# Patient Record
Sex: FEMALE | Race: WHITE | Employment: OTHER | URBAN - METROPOLITAN AREA
[De-identification: names, ages, dates, MRNs, and addresses within clinical notes are randomized per-mention and may not be internally consistent; named-entity substitution may affect disease eponyms.]

---

## 2017-03-02 ENCOUNTER — OP HISTORICAL/CONVERTED ENCOUNTER (OUTPATIENT)
Dept: OTHER | Age: 62
End: 2017-03-02

## 2017-03-06 ENCOUNTER — OP HISTORICAL/CONVERTED ENCOUNTER (OUTPATIENT)
Dept: OTHER | Age: 62
End: 2017-03-06

## 2017-03-13 ENCOUNTER — OP HISTORICAL/CONVERTED ENCOUNTER (OUTPATIENT)
Dept: OTHER | Age: 62
End: 2017-03-13

## 2020-03-05 ENCOUNTER — ED HISTORICAL/CONVERTED ENCOUNTER (OUTPATIENT)
Dept: OTHER | Age: 65
End: 2020-03-05

## 2020-05-19 ENCOUNTER — ED HISTORICAL/CONVERTED ENCOUNTER (OUTPATIENT)
Dept: OTHER | Age: 65
End: 2020-05-19

## 2021-05-11 ENCOUNTER — HOSPITAL ENCOUNTER (INPATIENT)
Age: 66
LOS: 3 days | Discharge: HOME | DRG: 871 | End: 2021-05-14
Attending: HOSPITALIST | Admitting: HOSPITALIST
Payer: MEDICARE

## 2021-05-11 ENCOUNTER — APPOINTMENT (OUTPATIENT)
Dept: GENERAL RADIOLOGY | Age: 66
DRG: 871 | End: 2021-05-11
Attending: PHYSICIAN ASSISTANT
Payer: MEDICARE

## 2021-05-11 ENCOUNTER — APPOINTMENT (OUTPATIENT)
Dept: CT IMAGING | Age: 66
DRG: 871 | End: 2021-05-11
Attending: PHYSICIAN ASSISTANT
Payer: MEDICARE

## 2021-05-11 ENCOUNTER — APPOINTMENT (OUTPATIENT)
Dept: NON INVASIVE DIAGNOSTICS | Age: 66
DRG: 871 | End: 2021-05-11
Attending: PHYSICIAN ASSISTANT
Payer: MEDICARE

## 2021-05-11 DIAGNOSIS — J44.1 COPD EXACERBATION (HCC): ICD-10-CM

## 2021-05-11 DIAGNOSIS — L03.119 CELLULITIS OF LOWER EXTREMITY, UNSPECIFIED LATERALITY: ICD-10-CM

## 2021-05-11 DIAGNOSIS — T40.2X1A OPIOID OVERDOSE, ACCIDENTAL OR UNINTENTIONAL, INITIAL ENCOUNTER (HCC): ICD-10-CM

## 2021-05-11 DIAGNOSIS — R60.9 PERIPHERAL EDEMA: ICD-10-CM

## 2021-05-11 DIAGNOSIS — D69.6 THROMBOCYTOPENIA (HCC): ICD-10-CM

## 2021-05-11 DIAGNOSIS — C50.919 METASTATIC BREAST CANCER (HCC): ICD-10-CM

## 2021-05-11 DIAGNOSIS — R41.82 ALTERED MENTAL STATUS, UNSPECIFIED ALTERED MENTAL STATUS TYPE: ICD-10-CM

## 2021-05-11 DIAGNOSIS — R50.9 FEVER OF UNKNOWN ORIGIN: Primary | ICD-10-CM

## 2021-05-11 PROBLEM — I10 HTN (HYPERTENSION): Status: ACTIVE | Noted: 2021-05-11

## 2021-05-11 PROBLEM — A41.9 SEPSIS (HCC): Status: ACTIVE | Noted: 2021-05-11

## 2021-05-11 PROBLEM — E78.5 HLD (HYPERLIPIDEMIA): Status: ACTIVE | Noted: 2021-05-11

## 2021-05-11 PROBLEM — J45.901 REACTIVE AIRWAY DISEASE WITH ACUTE EXACERBATION: Status: ACTIVE | Noted: 2021-05-11

## 2021-05-11 PROBLEM — F32.A DEPRESSION: Status: ACTIVE | Noted: 2021-05-11

## 2021-05-11 LAB
ALBUMIN SERPL-MCNC: 3 G/DL (ref 3.5–5)
ALBUMIN/GLOB SERPL: 0.7 {RATIO} (ref 1.1–2.2)
ALP SERPL-CCNC: 193 U/L (ref 45–117)
ALT SERPL-CCNC: 22 U/L (ref 12–78)
AMPHET UR QL SCN: NEGATIVE
ANION GAP SERPL CALC-SCNC: 6 MMOL/L (ref 5–15)
APPEARANCE UR: ABNORMAL
AST SERPL W P-5'-P-CCNC: 24 U/L (ref 15–37)
ATRIAL RATE: 85 BPM
BACTERIA URNS QL MICRO: NEGATIVE /HPF
BARBITURATES UR QL SCN: NEGATIVE
BASOPHILS # BLD: 0.1 K/UL (ref 0–0.1)
BASOPHILS NFR BLD: 2 % (ref 0–1)
BENZODIAZ UR QL: NEGATIVE
BILIRUB SERPL-MCNC: 0.6 MG/DL (ref 0.2–1)
BILIRUB UR QL: NEGATIVE
BUN SERPL-MCNC: 19 MG/DL (ref 6–20)
BUN/CREAT SERPL: 13 (ref 12–20)
CA-I BLD-MCNC: 8.9 MG/DL (ref 8.5–10.1)
CALCULATED P AXIS, ECG09: 65 DEGREES
CALCULATED R AXIS, ECG10: 67 DEGREES
CALCULATED T AXIS, ECG11: 45 DEGREES
CANNABINOIDS UR QL SCN: NEGATIVE
CHLORIDE SERPL-SCNC: 104 MMOL/L (ref 97–108)
CO2 SERPL-SCNC: 23 MMOL/L (ref 21–32)
COCAINE UR QL SCN: NEGATIVE
COLOR UR: ABNORMAL
COVID-19 RAPID TEST, COVR: DETECTED
CREAT SERPL-MCNC: 1.45 MG/DL (ref 0.55–1.02)
DIAGNOSIS, 93000: NORMAL
DIFFERENTIAL METHOD BLD: ABNORMAL
DRUG SCRN COMMENT,DRGCM: ABNORMAL
EOSINOPHIL # BLD: 0 K/UL (ref 0–0.4)
EOSINOPHIL NFR BLD: 0 % (ref 0–7)
ERYTHROCYTE [DISTWIDTH] IN BLOOD BY AUTOMATED COUNT: 14.7 % (ref 11.5–14.5)
ETHANOL SERPL-MCNC: <4 MG/DL
GLOBULIN SER CALC-MCNC: 4.2 G/DL (ref 2–4)
GLUCOSE BLD STRIP.AUTO-MCNC: 209 MG/DL (ref 65–117)
GLUCOSE SERPL-MCNC: 140 MG/DL (ref 65–100)
GLUCOSE UR STRIP.AUTO-MCNC: NEGATIVE MG/DL
HCT VFR BLD AUTO: 27.3 % (ref 35–47)
HGB BLD-MCNC: 8.8 G/DL (ref 11.5–16)
HGB UR QL STRIP: NEGATIVE
IMM GRANULOCYTES # BLD AUTO: 0 K/UL (ref 0–0.04)
IMM GRANULOCYTES NFR BLD AUTO: 0 % (ref 0–0.5)
KETONES UR QL STRIP.AUTO: NEGATIVE MG/DL
LACTATE SERPL-SCNC: 1.3 MMOL/L (ref 0.4–2)
LEUKOCYTE ESTERASE UR QL STRIP.AUTO: NEGATIVE
LYMPHOCYTES # BLD: 0.4 K/UL (ref 0.8–3.5)
LYMPHOCYTES NFR BLD: 16 % (ref 12–49)
MAGNESIUM SERPL-MCNC: 1.9 MG/DL (ref 1.6–2.4)
MCH RBC QN AUTO: 33.6 PG (ref 26–34)
MCHC RBC AUTO-ENTMCNC: 32.2 G/DL (ref 30–36.5)
MCV RBC AUTO: 104.2 FL (ref 80–99)
METHADONE UR QL: NEGATIVE
MONOCYTES # BLD: 0.4 K/UL (ref 0–1)
MONOCYTES NFR BLD: 10 % (ref 5–13)
MUCOUS THREADS URNS QL MICRO: ABNORMAL /LPF
NEUTS BAND NFR BLD MANUAL: 2 %
NEUTS SEG # BLD: 2.2 K/UL (ref 1.8–8)
NEUTS SEG NFR BLD: 72 % (ref 32–75)
NITRITE UR QL STRIP.AUTO: NEGATIVE
NRBC # BLD: 0 K/UL (ref 0–0.01)
NRBC BLD-RTO: 0 PER 100 WBC
OPIATES UR QL: POSITIVE
P-R INTERVAL, ECG05: 138 MS
PCP UR QL: NEGATIVE
PERFORMED BY, TECHID: ABNORMAL
PH UR STRIP: 7 [PH] (ref 5–8)
PLATELET # BLD AUTO: 138 K/UL (ref 150–400)
PMV BLD AUTO: 10.6 FL (ref 8.9–12.9)
POTASSIUM SERPL-SCNC: 3.8 MMOL/L (ref 3.5–5.1)
PROCALCITONIN SERPL-MCNC: 0.13 NG/ML
PROT SERPL-MCNC: 7.2 G/DL (ref 6.4–8.2)
PROT UR STRIP-MCNC: 100 MG/DL
Q-T INTERVAL, ECG07: 392 MS
QRS DURATION, ECG06: 90 MS
QTC CALCULATION (BEZET), ECG08: 466 MS
RBC # BLD AUTO: 2.62 M/UL (ref 3.8–5.2)
RBC #/AREA URNS HPF: ABNORMAL /HPF (ref 0–5)
RBC MORPH BLD: ABNORMAL
RBC MORPH BLD: ABNORMAL
SARS-COV-2, COV2: NORMAL
SODIUM SERPL-SCNC: 133 MMOL/L (ref 136–145)
SP GR UR REFRACTOMETRY: 1.01 (ref 1–1.03)
SPECIMEN SOURCE: ABNORMAL
TROPONIN I SERPL-MCNC: <0.05 NG/ML
TSH SERPL DL<=0.05 MIU/L-ACNC: 2.86 UIU/ML (ref 0.36–3.74)
UA: UC IF INDICATED,UAUC: ABNORMAL
UROBILINOGEN UR QL STRIP.AUTO: 4 EU/DL (ref 0.1–1)
VENTRICULAR RATE, ECG03: 85 BPM
WBC # BLD AUTO: 3 K/UL (ref 3.6–11)
WBC URNS QL MICRO: ABNORMAL /HPF (ref 0–4)

## 2021-05-11 PROCEDURE — 82962 GLUCOSE BLOOD TEST: CPT

## 2021-05-11 PROCEDURE — 96361 HYDRATE IV INFUSION ADD-ON: CPT

## 2021-05-11 PROCEDURE — 87040 BLOOD CULTURE FOR BACTERIA: CPT

## 2021-05-11 PROCEDURE — 74011250636 HC RX REV CODE- 250/636: Performed by: PHYSICIAN ASSISTANT

## 2021-05-11 PROCEDURE — 87186 SC STD MICRODIL/AGAR DIL: CPT

## 2021-05-11 PROCEDURE — 84484 ASSAY OF TROPONIN QUANT: CPT

## 2021-05-11 PROCEDURE — 74011250637 HC RX REV CODE- 250/637: Performed by: HOSPITALIST

## 2021-05-11 PROCEDURE — 84443 ASSAY THYROID STIM HORMONE: CPT

## 2021-05-11 PROCEDURE — 99218 HC RM OBSERVATION: CPT

## 2021-05-11 PROCEDURE — 83605 ASSAY OF LACTIC ACID: CPT

## 2021-05-11 PROCEDURE — 85025 COMPLETE CBC W/AUTO DIFF WBC: CPT

## 2021-05-11 PROCEDURE — 65270000029 HC RM PRIVATE

## 2021-05-11 PROCEDURE — 74011000258 HC RX REV CODE- 258: Performed by: PHYSICIAN ASSISTANT

## 2021-05-11 PROCEDURE — 93005 ELECTROCARDIOGRAM TRACING: CPT

## 2021-05-11 PROCEDURE — 71045 X-RAY EXAM CHEST 1 VIEW: CPT

## 2021-05-11 PROCEDURE — 99285 EMERGENCY DEPT VISIT HI MDM: CPT

## 2021-05-11 PROCEDURE — 87077 CULTURE AEROBIC IDENTIFY: CPT

## 2021-05-11 PROCEDURE — 81001 URINALYSIS AUTO W/SCOPE: CPT

## 2021-05-11 PROCEDURE — 80307 DRUG TEST PRSMV CHEM ANLYZR: CPT

## 2021-05-11 PROCEDURE — 82077 ASSAY SPEC XCP UR&BREATH IA: CPT

## 2021-05-11 PROCEDURE — 87086 URINE CULTURE/COLONY COUNT: CPT

## 2021-05-11 PROCEDURE — 96374 THER/PROPH/DIAG INJ IV PUSH: CPT

## 2021-05-11 PROCEDURE — 93970 EXTREMITY STUDY: CPT

## 2021-05-11 PROCEDURE — 74011250637 HC RX REV CODE- 250/637: Performed by: PHYSICIAN ASSISTANT

## 2021-05-11 PROCEDURE — 96375 TX/PRO/DX INJ NEW DRUG ADDON: CPT

## 2021-05-11 PROCEDURE — 74011250636 HC RX REV CODE- 250/636: Performed by: HOSPITALIST

## 2021-05-11 PROCEDURE — 80053 COMPREHEN METABOLIC PANEL: CPT

## 2021-05-11 PROCEDURE — 83735 ASSAY OF MAGNESIUM: CPT

## 2021-05-11 PROCEDURE — 84145 PROCALCITONIN (PCT): CPT

## 2021-05-11 PROCEDURE — 36415 COLL VENOUS BLD VENIPUNCTURE: CPT

## 2021-05-11 PROCEDURE — 87635 SARS-COV-2 COVID-19 AMP PRB: CPT

## 2021-05-11 PROCEDURE — 70450 CT HEAD/BRAIN W/O DYE: CPT

## 2021-05-11 RX ORDER — SODIUM CHLORIDE 0.9 % (FLUSH) 0.9 %
5-40 SYRINGE (ML) INJECTION EVERY 8 HOURS
Status: DISCONTINUED | OUTPATIENT
Start: 2021-05-11 | End: 2021-05-14 | Stop reason: HOSPADM

## 2021-05-11 RX ORDER — MONTELUKAST SODIUM 10 MG/1
10 TABLET ORAL DAILY
COMMUNITY

## 2021-05-11 RX ORDER — GABAPENTIN 300 MG/1
300 CAPSULE ORAL 3 TIMES DAILY
COMMUNITY

## 2021-05-11 RX ORDER — NALOXONE HYDROCHLORIDE 1 MG/ML
0.4 INJECTION INTRAMUSCULAR; INTRAVENOUS; SUBCUTANEOUS
Status: COMPLETED | OUTPATIENT
Start: 2021-05-11 | End: 2021-05-11

## 2021-05-11 RX ORDER — SODIUM CHLORIDE 0.9 % (FLUSH) 0.9 %
5-40 SYRINGE (ML) INJECTION AS NEEDED
Status: DISCONTINUED | OUTPATIENT
Start: 2021-05-11 | End: 2021-05-14 | Stop reason: HOSPADM

## 2021-05-11 RX ORDER — WARFARIN 1 MG/1
5 TABLET ORAL DAILY
COMMUNITY

## 2021-05-11 RX ORDER — TRAZODONE HYDROCHLORIDE 100 MG/1
100 TABLET ORAL
COMMUNITY

## 2021-05-11 RX ORDER — DILTIAZEM HYDROCHLORIDE 240 MG/1
240 CAPSULE, EXTENDED RELEASE ORAL DAILY
COMMUNITY

## 2021-05-11 RX ORDER — ALLOPURINOL 300 MG/1
300 TABLET ORAL DAILY
COMMUNITY

## 2021-05-11 RX ORDER — ACETAMINOPHEN 325 MG/1
650 TABLET ORAL
Status: COMPLETED | OUTPATIENT
Start: 2021-05-11 | End: 2021-05-11

## 2021-05-11 RX ORDER — POLYETHYLENE GLYCOL 3350 17 G/17G
17 POWDER, FOR SOLUTION ORAL DAILY PRN
Status: DISCONTINUED | OUTPATIENT
Start: 2021-05-11 | End: 2021-05-14 | Stop reason: HOSPADM

## 2021-05-11 RX ORDER — ACETAMINOPHEN 325 MG/1
650 TABLET ORAL
Status: DISCONTINUED | OUTPATIENT
Start: 2021-05-11 | End: 2021-05-14 | Stop reason: HOSPADM

## 2021-05-11 RX ORDER — PRAVASTATIN SODIUM 40 MG/1
40 TABLET ORAL
COMMUNITY

## 2021-05-11 RX ORDER — ABEMACICLIB 100 MG/1
100 TABLET ORAL
COMMUNITY

## 2021-05-11 RX ORDER — SERTRALINE HYDROCHLORIDE 50 MG/1
50 TABLET, FILM COATED ORAL DAILY
COMMUNITY

## 2021-05-11 RX ORDER — OXYCODONE HYDROCHLORIDE 10 MG/1
10 TABLET ORAL
COMMUNITY

## 2021-05-11 RX ORDER — ACETAMINOPHEN 650 MG/1
650 SUPPOSITORY RECTAL
Status: DISCONTINUED | OUTPATIENT
Start: 2021-05-11 | End: 2021-05-14 | Stop reason: HOSPADM

## 2021-05-11 RX ORDER — PROMETHAZINE HYDROCHLORIDE 25 MG/1
12.5 TABLET ORAL
Status: DISCONTINUED | OUTPATIENT
Start: 2021-05-11 | End: 2021-05-14 | Stop reason: HOSPADM

## 2021-05-11 RX ORDER — ONDANSETRON 2 MG/ML
4 INJECTION INTRAMUSCULAR; INTRAVENOUS
Status: DISCONTINUED | OUTPATIENT
Start: 2021-05-11 | End: 2021-05-14 | Stop reason: HOSPADM

## 2021-05-11 RX ORDER — THEOPHYLLINE 300 MG/1
300 TABLET, EXTENDED RELEASE ORAL 2 TIMES DAILY
COMMUNITY

## 2021-05-11 RX ORDER — IPRATROPIUM BROMIDE AND ALBUTEROL SULFATE 2.5; .5 MG/3ML; MG/3ML
3 SOLUTION RESPIRATORY (INHALATION)
Status: ACTIVE | OUTPATIENT
Start: 2021-05-11 | End: 2021-05-12

## 2021-05-11 RX ORDER — IPRATROPIUM BROMIDE AND ALBUTEROL SULFATE 2.5; .5 MG/3ML; MG/3ML
3 SOLUTION RESPIRATORY (INHALATION)
Status: DISCONTINUED | OUTPATIENT
Start: 2021-05-11 | End: 2021-05-14 | Stop reason: HOSPADM

## 2021-05-11 RX ORDER — CLONIDINE HYDROCHLORIDE 0.1 MG/1
0.1 TABLET ORAL 2 TIMES DAILY
COMMUNITY

## 2021-05-11 RX ADMIN — METHYLPREDNISOLONE SODIUM SUCCINATE 40 MG: 40 INJECTION, POWDER, FOR SOLUTION INTRAMUSCULAR; INTRAVENOUS at 20:55

## 2021-05-11 RX ADMIN — VANCOMYCIN HYDROCHLORIDE 1000 MG: 1 INJECTION, POWDER, LYOPHILIZED, FOR SOLUTION INTRAVENOUS at 20:20

## 2021-05-11 RX ADMIN — METHYLPREDNISOLONE SODIUM SUCCINATE 125 MG: 125 INJECTION, POWDER, FOR SOLUTION INTRAMUSCULAR; INTRAVENOUS at 14:05

## 2021-05-11 RX ADMIN — SODIUM CHLORIDE 1000 ML: 9 INJECTION, SOLUTION INTRAVENOUS at 11:32

## 2021-05-11 RX ADMIN — ACETAMINOPHEN 650 MG: 325 TABLET, FILM COATED ORAL at 11:55

## 2021-05-11 RX ADMIN — PIPERACILLIN AND TAZOBACTAM 3.38 G: 3; .375 INJECTION, POWDER, LYOPHILIZED, FOR SOLUTION INTRAVENOUS at 19:24

## 2021-05-11 RX ADMIN — ACETAMINOPHEN 650 MG: 325 TABLET, FILM COATED ORAL at 20:56

## 2021-05-11 RX ADMIN — NALOXONE HYDROCHLORIDE 0.4 MG: 1 INJECTION PARENTERAL at 14:37

## 2021-05-11 RX ADMIN — Medication 10 ML: at 17:11

## 2021-05-11 NOTE — ACP (ADVANCE CARE PLANNING)
Advance Care Planning   Healthcare Decision Maker:       Primary Decision Maker: Janessa Scales child - [phone]

## 2021-05-11 NOTE — Clinical Note
Patient Class[de-identified] OBSERVATION [104]  Type of Bed: Medical [8]  Cardiac Monitoring Required?: No  Reason for Observation: fever  Admitting Diagnosis: Fever [[id number]]  Admitting Physician: Isael Hector [[id number]]  Attending Physician: Isael Hector [[id number]]

## 2021-05-11 NOTE — ED TRIAGE NOTES
EMS brought to pt home by  who stated pt wasn't acting herself. Pt extremely lethargic, warm to touch.  en route. In triage, pt noted to have wet cough. A&Ox3.

## 2021-05-11 NOTE — H&P
History and Physical    Patient: Benito Snellen MRN: [id number]  SSN: [id number]    YOB: 1955  Age: 77 y.o. Sex: female      Subjective:      Chief Complaint: Lethargy and shortness of breath    HPI: Benito Snellen is a 77 y.o. female who history of breast cancer with metastasis, hyperlipidemia, hypertension, reactive airway disease came to ER with a complaint of worsening shortness of breath for about a week now. Patient was also very lethargic at home. Upon arrival to the ER patient was found to have feverTemperature 101.6 °F.  Patient does not remember any fevers at home. Patient has a watery bowel movement in ER which was quite foul-smelling. She denies any abdominal pain nausea or vomiting. Hospitalist service has been requested to admit the patient for further work-up and management. Past Medical History:   Diagnosis Date    Breast cancer ([facility] [address]      No past surgical history on file. No family history on file. Social History     Tobacco Use    Smoking status: Not on file   Substance Use Topics    Alcohol use: Not on file      Home medications were confirmed from patient's boyfriend Mr. Dori Shah phone #[phone]. Prior to Admission medications    Medication Sig Start Date End Date Taking? Authorizing Provider   cloNIDine HCL (CATAPRES) 0.1 mg tablet Take 0.1 mg by mouth two (2) times a day. Yes Provider, Historical   dilTIAZem ER (TIAZAC) 240 mg capsule Take 240 mg by mouth daily. Yes Provider, Historical   gabapentin (NEURONTIN) 300 mg capsule Take 300 mg by mouth three (3) times daily. Yes Provider, Historical   allopurinoL (ZYLOPRIM) 300 mg tablet Take 300 mg by mouth daily. Yes Provider, Historical   warfarin (COUMADIN) 1 mg tablet Take 5 mg by mouth daily. Take 1 1/2 mon to Friday and 1 tab on sat and sunday   Yes Provider, Historical   traZODone (DESYREL) 100 mg tablet Take 100 mg by mouth nightly.    Yes Provider, Historical   montelukast (SINGULAIR) 10 mg tablet Take 10 mg by mouth daily. Yes Provider, Historical   theophylline ER,12 hour, (THEOCHRON) 300 mg tablet Take 300 mg by mouth two (2) times a day. Yes Provider, Historical   pravastatin (PRAVACHOL) 40 mg tablet Take 40 mg by mouth nightly. Yes Provider, Historical   abemaciclib (Verzenio) 100 mg tab Take 100 mg by mouth nightly. Yes Provider, Historical   sertraline (ZOLOFT) 50 mg tablet Take 50 mg by mouth daily. Yes Provider, Historical   oxyCODONE IR (ROXICODONE) 10 mg tab immediate release tablet Take 10 mg by mouth every six (6) hours as needed for Pain. Yes Provider, Historical        No Known Allergies    Review of Systems:  Constitutional: No fevers, No chills, ++ fatigue, No weakness  Eyes: No visual disturbance  Ears, Nose, Mouth, Throat, and Face: No nasal congestion, No sore throat  Respiratory: No cough, No sputum, No wheezing, ++ SOB  Cardiovascular: No chest pain, No lower extremity edema, No Palpitations   Gastrointestinal: No nausea, No vomiting, No diarrhea, No constipation, No abdominal pain  Genitourinary: No frequency, No dysuria, No hematuria  Integument/Breast: No rash, No skin lesion(s), No dryness  Musculoskeletal: No arthralgias, No neck pain, No back pain  Neurological: No headaches, No dizziness, No confusion,  No seizures  Behavioral/Psychiatric: No anxiety, No depression      Objective:     Vitals:    05/11/21 1140 05/11/21 1142 05/11/21 1405 05/11/21 1520   BP:    (!) 159/61   Pulse:    98   Resp:    18   Temp:   98.7 °F (37.1 °C)    SpO2: (!) 86% 100%  99%   Weight:       Height:            Physical Exam:  General: Very lethargic but easily arousable. Clear speech  HEAD: Normocephalic, atraumatic. Eye: PERRLA, EOMI. Throat and Neck: normal and no erythema or exudates noted. No mass   Lung: Bilateral breath sounds are present, coarse expiratory wheezing is present bilaterally. Heart: Regular rate and rhythm. Normal S1/S2. NO appreciated murmurs, rubs or gallops.     Abdomen: soft, non-tender. Bowel sounds present in all four quadrants. No masses appreciated. Extremities:  able to move all extremities normal, atraumatic  Skin: Clean, dry and intact without appreciated lesions. Neurologic: AOx3. Cranial nerves 2-12 and sensation grossly intact. Psychiatric: non focal, normal affect, normal thought process    No results for input(s): WBC, HGB, HCT, PLT, HGBEXT, HCTEXT, PLTEXT in the last 72 hours. Recent Labs     05/11/21  1123   *   K 3.8      CO2 23   *   BUN 19   CREA 1.45*   CA 8.9   MG 1.9   ALB 3.0*   TBILI 0.6   ALT 22     No results for input(s): PH, PCO2, PO2, HCO3, FIO2 in the last 72 hours. CT HEAD WO CONT   Final Result   No intracranial hemorrhage. XR CHEST PORT   Final Result   No plain film evidence for CHF or pneumonia. Suspect bone metastases right humerus. Assessment:     Active Problems:    Fever (5/11/2021)      Sepsis ([address] (5/11/2021)      Breast cancer ([facility] [address] (5/11/2021)      HTN (hypertension) (5/11/2021)      Reactive airway disease with acute exacerbation (5/11/2021)      HLD (hyperlipidemia) (5/11/2021)      Depression (5/11/2021)         Plan:     68-year-old female came to ER with a complaint of change in mental status and shortness of breath and was found to have fever. 1.  Acute encephalopathy likely due to sepsis. Patient's WBC count is not available at this time is a CBC need to be redrawn. We will follow-up on the results of CBC. Empirically start the patient on IV antibiotic vancomycin and Zosyn for now. IV hydration will also be started on the patient. 2.  Reactive airway disease with acute exacerbation: Patient is on Singulair at home. We will continue that. We will start the patient on duo nebulizers and also give IV steroids as she is having active wheezing at this time. Oxygen supplementation will be given as needed. 3.  Hypertension: Resume patient's clonidine and Cardizem.   4.  Hyperlipidemia: Continue patient on simvastatin  5. Depression: Continue patient on Zoloft  6. History of breast cancer: Continue patient on Verzeniopatient can bring her home medications.   New medications were reviewed and reconciled  Further plan of management depend upon patient's clinical course in the hospital  Patient is a full code  Patient's daughter is her surrogate Judy Mathews phone #[phone]  Deatra Nephew patient's boyfriend contact number is [phone]    Signed By: Steve Roman MD     May 11, 2021

## 2021-05-11 NOTE — PROGRESS NOTES
Reason for Admission:  Sepsis                     RUR Score:  N/A                   Plan for utilizing home health:  Pt signed Choice List. Referral sent via [facility]. PCP: First and Last name:  Dr. Albert Teague     Name of Practice:    Are you a current patient: Yes/No: Yes   Approximate date of last visit: Last Thursaday - 5/6/21. Can you participate in a virtual visit with your PCP: Yes/Call                    Current Advanced Directive/Advance Care Plan: Full Code      Healthcare Decision Maker:   Primary HCDM is shelby Argueta @ [phone]). Transition of Care Plan:                    D/C Plan is home with home health. Pts boyfriend to transport home upon discharge. Staff to call shelby Argueta) who will inform boyfriend.

## 2021-05-11 NOTE — PROGRESS NOTES
5/11/21. PCP is Dr. Jarod Lucas. Per daughter , pt t lives with boyfriend & uses walker. Pt signed Choice Letter requesting home health. Referral sent via [facility]. Per daughter pts boyfriend will transport pt home upon discharge - staff to call shelby Ragsdale @ [phone]) who will inform boyfriend.

## 2021-05-11 NOTE — ED PROVIDER NOTES
EMERGENCY DEPARTMENT HISTORY AND PHYSICAL EXAM      Date: 5/11/2021  Patient Name: Huseyin Downey    History of Presenting Illness     Chief Complaint   Patient presents with    Fever       History Provided By: Patient, EMS and Family    HPI: Huseyin Downey, 77 y.o. female with a past medical history significant COPD and active breast cancer with bone metastases, hypertension, on Coumadin and oxycodone for pain presents to the ED with cc of lethargy and fever onset yesterday. Patient continues to state that she is \" just so tired. \"   at bedside who states that patient has not been acting herself over the last 2 days. Mild confusion. No reported head trauma or recent falls. Patient is currently receiving chemotherapy. Cough noted on patient's arrival.  Patient specifically denies chest pain, abdominal pain, vomiting. Has been confirms that patient takes oxycodone for pain. There are no other complaints, changes, or physical findings at this time.     PCP: Unknown, Provider    Current Facility-Administered Medications   Medication Dose Route Frequency Provider Last Rate Last Admin    albuterol-ipratropium (DUO-NEB) 2.5 MG-0.5 MG/3 ML  3 mL Nebulization NOW Marci Valentino PA-C        piperacillin-tazobactam (ZOSYN) 3.375 g in 0.9% sodium chloride (MBP/ADV) 100 mL MBP  3.375 g IntraVENous NOW Marci Valentino PA-C        vancomycin (VANCOCIN) 1,000 mg in 0.9% sodium chloride 250 mL (VIAL-MATE)  1,000 mg IntraVENous NOW Marci Valentino PA-C        sodium chloride (NS) flush 5-40 mL  5-40 mL IntraVENous Q8H Adrienne Ni MD        sodium chloride (NS) flush 5-40 mL  5-40 mL IntraVENous PRN Adrienne Ni MD        acetaminophen (TYLENOL) tablet 650 mg  650 mg Oral Q6H PRN Adrienne Ni MD        Or   Polly Cushing acetaminophen (TYLENOL) suppository 650 mg  650 mg Rectal Q6H PRN Adrienne Ni MD        polyethylene glycol (MIRALAX) packet 17 g  17 g Oral DAILY PRN Leocadia Bouillon, MD Polly Cushing promethazine (PHENERGAN) tablet 12.5 mg  12.5 mg Oral Q6H PRN Ronnie Ford MD        Or    ondansetron [facility]) injection 4 mg  4 mg IntraVENous Q6H PRN Ronnie Ford MD        piperacillin-tazobactam (ZOSYN) 3.375 g in 0.9% sodium chloride (MBP/ADV) 100 mL MBP  3.375 g IntraVENous Q6H Ronnie Ford MD        albuterol-ipratropium (DUO-NEB) 2.5 MG-0.5 MG/3 ML  3 mL Nebulization Q6H PRN Ronnie Ford MD        methylPREDNISolone (PF) (SOLU-MEDROL) injection 40 mg  40 mg IntraVENous Q6H Ronnie Ford MD         Current Outpatient Medications   Medication Sig Dispense Refill    cloNIDine HCL (CATAPRES) 0.1 mg tablet Take 0.1 mg by mouth two (2) times a day.  dilTIAZem ER (TIAZAC) 240 mg capsule Take 240 mg by mouth daily.  gabapentin (NEURONTIN) 300 mg capsule Take 300 mg by mouth three (3) times daily.  allopurinoL (ZYLOPRIM) 300 mg tablet Take 300 mg by mouth daily.  warfarin (COUMADIN) 1 mg tablet Take 5 mg by mouth daily. Take 1 1/2 mon to Friday and 1 tab on sat and sunday      traZODone (DESYREL) 100 mg tablet Take 100 mg by mouth nightly.  montelukast (SINGULAIR) 10 mg tablet Take 10 mg by mouth daily.  theophylline ER,12 hour, (THEOCHRON) 300 mg tablet Take 300 mg by mouth two (2) times a day.  pravastatin (PRAVACHOL) 40 mg tablet Take 40 mg by mouth nightly.  abemaciclib (Verzenio) 100 mg tab Take 100 mg by mouth nightly.  sertraline (ZOLOFT) 50 mg tablet Take 50 mg by mouth daily.  oxyCODONE IR (ROXICODONE) 10 mg tab immediate release tablet Take 10 mg by mouth every six (6) hours as needed for Pain. Past History     Past Medical History:  Past Medical History:   Diagnosis Date    Breast cancer [facility])        Past Surgical History:  No past surgical history on file. Family History:  No family history on file.     Social History:  Social History     Tobacco Use    Smoking status: Not on file   Substance Use Topics    Alcohol use: Not on file    Drug use: Not on file       Allergies:  No Known Allergies      Review of Systems   Review of Systems   Constitutional: Positive for fatigue and fever. Negative for activity change and chills. Lethargy   HENT: Negative for congestion, ear pain, rhinorrhea, sneezing and sore throat. Eyes: Negative for pain and visual disturbance. Respiratory: Positive for cough and shortness of breath. Cardiovascular: Negative for chest pain. Gastrointestinal: Negative for abdominal pain, diarrhea, nausea and vomiting. Genitourinary: Negative for dysuria and hematuria. Musculoskeletal: Negative for gait problem. Skin: Negative for rash. Neurological: Negative for speech difficulty, weakness and headaches. Psychiatric/Behavioral: Positive for confusion. The patient is not nervous/anxious. All other systems reviewed and are negative. Physical Exam   Physical Exam  Vitals signs and nursing note reviewed. Constitutional:       General: She is not in acute distress. Appearance: Normal appearance. She is ill-appearing. She is not toxic-appearing. HENT:      Head: Normocephalic and atraumatic. Nose: Nose normal.      Mouth/Throat:      Mouth: Mucous membranes are moist.   Eyes:      Extraocular Movements: Extraocular movements intact. Conjunctiva/sclera: Conjunctivae normal.      Pupils: Pupils are equal, round, and reactive to light. Comments: Pupils pinpoint bilaterally   Neck:      Musculoskeletal: Normal range of motion and neck supple. Cardiovascular:      Rate and Rhythm: Normal rate. Pulses: Normal pulses. Heart sounds: Normal heart sounds. Comments: BLE: +erythematous, warm to touch  Pulmonary:      Effort: Pulmonary effort is normal. Tachypnea (mild) present. No respiratory distress. Breath sounds: Wheezing (expiratory) and rhonchi present. Abdominal:      General: Abdomen is protuberant.  Bowel sounds are normal.      Palpations: Abdomen is soft. Tenderness: There is no abdominal tenderness. There is no guarding or rebound. Musculoskeletal: Normal range of motion. General: No deformity or signs of injury. Right lower leg: 3+ Edema present. Left lower leg: 3+ Edema present. Skin:     General: Skin is warm and dry. Capillary Refill: Capillary refill takes less than 2 seconds. Findings: Erythema present. Comments: BLE: +erythematous and warm to touch from toes to lower thighs, +pitting edema, +blanching appearance   Neurological:      General: No focal deficit present. Mental Status: She is easily aroused. She is lethargic and confused. GCS: GCS eye subscore is 4. GCS verbal subscore is 5. GCS motor subscore is 6. Cranial Nerves: No cranial nerve deficit. Sensory: Sensation is intact. Motor: Motor function is intact.    Psychiatric:         Mood and Affect: Mood normal.         Diagnostic Study Results     Labs -     Recent Results (from the past 48 hour(s))   EKG, 12 LEAD, INITIAL    Collection Time: 05/11/21 11:16 AM   Result Value Ref Range    Ventricular Rate 85 BPM    Atrial Rate 85 BPM    P-R Interval 138 ms    QRS Duration 90 ms    Q-T Interval 392 ms    QTC Calculation (Bezet) 466 ms    Calculated P Axis 65 degrees    Calculated R Axis 67 degrees    Calculated T Axis 45 degrees    Diagnosis       Normal sinus rhythm  Nonspecific ST abnormality  Abnormal ECG  No previous ECGs available  Confirmed by Anthony SYLVESTER MD ([id number]) on 5/11/2021 12:02:47 PM     ETHYL ALCOHOL    Collection Time: 05/11/21 11:23 AM   Result Value Ref Range    ALCOHOL(ETHYL),SERUM <4 <10 mg/dL   TSH 3RD GENERATION    Collection Time: 05/11/21 11:23 AM   Result Value Ref Range    TSH 2.86 0.36 - 3.74 uIU/mL   TROPONIN I    Collection Time: 05/11/21 11:23 AM   Result Value Ref Range    Troponin-I, Qt. <0.05 <0.05 ng/mL   PROCALCITONIN    Collection Time: 05/11/21 11:23 AM   Result Value Ref Range Procalcitonin 0.13 (H) 0 ng/mL   MAGNESIUM    Collection Time: 05/11/21 11:23 AM   Result Value Ref Range    Magnesium 1.9 1.6 - 2.4 mg/dL   METABOLIC PANEL, COMPREHENSIVE    Collection Time: 05/11/21 11:23 AM   Result Value Ref Range    Sodium 133 (L) 136 - 145 mmol/L    Potassium 3.8 3.5 - 5.1 mmol/L    Chloride 104 97 - 108 mmol/L    CO2 23 21 - 32 mmol/L    Anion gap 6 5 - 15 mmol/L    Glucose 140 (H) 65 - 100 mg/dL    BUN 19 6 - 20 mg/dL    Creatinine 1.45 (H) 0.55 - 1.02 mg/dL    BUN/Creatinine ratio 13 12 - 20      GFR est AA 44 (L) >60 ml/min/1.73m2    GFR est non-AA 36 (L) >60 ml/min/1.73m2    Calcium 8.9 8.5 - 10.1 mg/dL    Bilirubin, total 0.6 0.2 - 1.0 mg/dL    AST (SGOT) 24 15 - 37 U/L    ALT (SGPT) 22 12 - 78 U/L    Alk. phosphatase 193 (H) 45 - 117 U/L    Protein, total 7.2 6.4 - 8.2 g/dL    Albumin 3.0 (L) 3.5 - 5.0 g/dL    Globulin 4.2 (H) 2.0 - 4.0 g/dL    A-G Ratio 0.7 (L) 1.1 - 2.2     LACTIC ACID    Collection Time: 05/11/21 11:23 AM   Result Value Ref Range    Lactic acid 1.3 0.4 - 2.0 mmol/L   DRUG SCREEN, URINE    Collection Time: 05/11/21 11:54 AM   Result Value Ref Range    AMPHETAMINES Negative Negative      BARBITURATES Negative Negative      BENZODIAZEPINES Negative Negative      COCAINE Negative Negative      METHADONE Negative Negative      OPIATES Positive (A) Negative      PCP(PHENCYCLIDINE) Negative Negative      THC (TH-CANNABINOL) Negative Negative      Drug screen comment        This test is a screen for drugs of abuse in a medical setting only (i.e., they are unconfirmed results and as such must not be used for non-medical purposes, e.g.,employment testing, legal testing). Due to its inherent nature, false positive (FP) and false negative (FN) results may be obtained. Therefore, if necessary for medical care, recommend confirmation of positive findings by GC/MS.    URINALYSIS W/ REFLEX CULTURE    Collection Time: 05/11/21 11:54 AM    Specimen: Urine   Result Value Ref Range Color Yellow/Straw      Appearance Turbid (A) Clear      Specific gravity 1.015 1.003 - 1.030      pH (UA) 7.0 5.0 - 8.0      Protein 100 (A) Negative mg/dL    Glucose Negative Negative mg/dL    Ketone Negative Negative mg/dL    Bilirubin Negative Negative      Blood Negative Negative      Urobilinogen 4.0 (H) 0.1 - 1.0 EU/dL    Nitrites Negative Negative      Leukocyte Esterase Negative Negative      UA:UC IF INDICATED Urine Culture Ordered (A) Culture not indicated by UA result      WBC 10-20 0 - 4 /hpf    RBC 0-5 0 - 5 /hpf    Bacteria Negative Negative /hpf    Mucus Trace /lpf   CBC WITH AUTOMATED DIFF    Collection Time: 05/11/21  2:45 PM   Result Value Ref Range    WBC 3.0 (L) 3.6 - 11.0 K/uL    RBC 2.62 (L) 3.80 - 5.20 M/uL    HGB 8.8 (L) 11.5 - 16.0 g/dL    HCT 27.3 (L) 35.0 - 47.0 %    .2 (H) 80.0 - 99.0 FL    MCH 33.6 26.0 - 34.0 PG    MCHC 32.2 30.0 - 36.5 g/dL    RDW 14.7 (H) 11.5 - 14.5 %    PLATELET 521 (L) 141 - 400 K/uL    MPV 10.6 8.9 - 12.9 FL    NRBC 0.0 0.0  WBC    ABSOLUTE NRBC 0.00 0.00 - 0.01 K/uL    NEUTROPHILS PENDING %    LYMPHOCYTES PENDING %    MONOCYTES PENDING %    EOSINOPHILS PENDING %    BASOPHILS PENDING %    IMMATURE GRANULOCYTES PENDING %    ABS. NEUTROPHILS PENDING K/UL    ABS. LYMPHOCYTES PENDING K/UL    ABS. MONOCYTES PENDING K/UL    ABS. EOSINOPHILS PENDING K/UL    ABS. BASOPHILS PENDING K/UL    ABS. IMM. GRANS. PENDING K/UL    DF PENDING    SARS-COV-2    Collection Time: 05/11/21  5:25 PM   Result Value Ref Range    SARS-CoV-2 Please find results under separate order         Radiologic Studies -   XR Results (most recent):  Results from Hospital Encounter encounter on 05/11/21   XR CHEST PORT    Narrative Chest single view. Reduced lung volumes. Central vessel crowding. No gross interstitial or alveolar  pulmonary edema. Right-sided Port-A-Cath. Cardiac and mediastinal structures  magnified on this AP view. Thoracic aorta atherosclerosis.  No pneumothorax. Suspect osseous metastatic disease proximal right humerus. Impression No plain film evidence for CHF or pneumonia. Suspect bone metastases right humerus. CT Results  (Last 48 hours)               05/11/21 1225  CT HEAD WO CONT Final result    Impression:  No intracranial hemorrhage. Narrative:  CT head. Axial images are reviewed along with reformatted sagittal/coronal images. Dose reduction: All CT scans at this facility are performed using dose reduction   optimization techniques as appropriate to a performed exam including the   following-   automated exposure control, adjustments of mA and/or Kv according to patient   size, or use of iterative reconstructive technique. .       Likely mucous retention cyst base left maxillary sinus. Otherwise, sinuses and   mastoid air cells are normally aerated. No definite superficial radiopaque   foreign material; however, clinical inspection could follow. Review of intracranial content reveals right basal ganglia lacune. Otherwise,   preserved gray-white differentiation. No hydrocephalus. No intracranial   hemorrhage. Medical Decision Making and ED Course   I am the first provider for this patient. I reviewed the vital signs, available nursing notes, past medical history, past surgical history, family history and social history. Vital Signs-Reviewed the patient's vital signs.   Patient Vitals for the past 12 hrs:   Temp Pulse Resp BP SpO2   05/11/21 1520  98 18 (!) 159/61 99 %   05/11/21 1405 98.7 °F (37.1 °C)       05/11/21 1142     100 %   05/11/21 1140     (!) 86 %   05/11/21 1110 (!) 101.6 °F (38.7 °C)    100 %   05/11/21 1105 (!) 101.1 °F (38.4 °C) 86 24 (!) 168/53 100 %       EKG interpretation: (Preliminary)  Normal sinus rhythm at 85 bpm, nonspecific ST wave abnormality, no STEMI, read by Dr. Tete Ward at 11:16 AM    Records Reviewed: Nursing Notes and Old Medical Records    Provider Notes (Medical Decision Making):       MDM  Number of Diagnoses or Management Options  Altered mental status, unspecified altered mental status type  Cellulitis of lower extremity, unspecified laterality  COPD exacerbation (HCC)  Fever of unknown origin  Metastatic breast cancer ([facility] [address]  Opioid overdose, accidental or unintentional, initial encounter [facility])  Peripheral edema  Diagnosis management comments: Differentials include opioid overdose, intracranial hemorrhage, pneumonia, UTI, metabolic encephalopathy, fever in cancer patient    Patient seemed to respond well to Narcan given, confusion improved. Patient with wheezing, mild hypoxia. Active cancer patient receiving chemotherapy with fever noted on arrival.  Will do rapid COVID-19 test. Admit to Hospitalist.       Amount and/or Complexity of Data Reviewed  Clinical lab tests: ordered and reviewed  Tests in the radiology section of CPT®: ordered and reviewed  Obtain history from someone other than the patient: yes  Review and summarize past medical records: yes          ED Course:   Initial assessment performed. The patients presenting problems have been discussed, and they are in agreement with the care plan formulated and outlined with them. I have encouraged them to ask questions as they arise throughout their visit. 4:32 PM  Progress Note:  Patient was re-evaluated at bedside. Awake, alert, and oriented x 4. Consult Note:  4:32 PM  Treasure Ronald, PAJeanaC spoke with Dr. Chino Serrano,  Specialty: Internal Medicine  Discussed pt's hx, disposition, and available diagnostic and imaging results. Reviewed care plans. Agrees with admission, recommends IV Zosyn and vancomycin and rapid Covid test.    Admit Note:  4:32 PM  Pt is being admitted by Dr. Chino Serrano. The results of their tests and reason(s) for their admission have been discussed with pt and/or available family. They convey agreement and understanding for the need to be admitted and for admission diagnosis. Procedures       Justin Yuen PA-C    Procedures     CRITICAL CARE NOTE :  12:08 PM  Amount of Critical Care Time: _30___(minutes)__    IMPENDING DETERIORATION -Respiratory and CNS  ASSOCIATED RISK FACTORS - Hypoxia, Metabolic changes and AMS  MANAGEMENT- Bedside Assessment and Supervision of Care  INTERPRETATION -  Xrays, ECG, Blood Pressure and Cardiac Output Measures   INTERVENTIONS - Metobolic interventions and Opioid reversal  CASE REVIEW - Hospitalist/Intensivist, Nursing and Family  TREATMENT RESPONSE -Improved and Stable  PERFORMED BY - KRIS    NOTES   :  I have spent critical care time involved in lab review, consultations with specialist, family decision- making, bedside attention and documentation. This time excludes time spent in any separate billed procedures. During this entire length of time I was immediately available to the patient . Justin Yuen PA-C        Disposition     Disposition: Admitted to Floor Medical Floor the case was discussed with the admitting physician Dr. Cyn Burton    Admitted    Diagnosis     Clinical Impression:   1. Fever of unknown origin    2. Metastatic breast cancer ([facility] [address]    3. COPD exacerbation (HCC)    4. Cellulitis of lower extremity, unspecified laterality    5. Peripheral edema    6. Opioid overdose, accidental or unintentional, initial encounter ([facility] [address]    7. Altered mental status, unspecified altered mental status type        Attestations:    Justin Yuen PA-C    Please note that this dictation was completed with [employer], the computer voice recognition software. Quite often unanticipated grammatical, syntax, homophones, and other interpretive errors are inadvertently transcribed by the computer software. Please disregard these errors. Please excuse any errors that have escaped final proofreading. Thank you.

## 2021-05-12 PROBLEM — U07.1 COVID-19: Status: ACTIVE | Noted: 2021-05-12

## 2021-05-12 LAB
ALBUMIN SERPL-MCNC: 3.3 G/DL (ref 3.5–5)
ALBUMIN/GLOB SERPL: 0.7 {RATIO} (ref 1.1–2.2)
ALP SERPL-CCNC: 203 U/L (ref 45–117)
ALT SERPL-CCNC: 25 U/L (ref 12–78)
ANION GAP SERPL CALC-SCNC: 11 MMOL/L (ref 5–15)
AST SERPL W P-5'-P-CCNC: 22 U/L (ref 15–37)
BASOPHILS # BLD: 0 K/UL (ref 0–0.1)
BASOPHILS NFR BLD: 0 % (ref 0–1)
BILIRUB SERPL-MCNC: 0.6 MG/DL (ref 0.2–1)
BUN SERPL-MCNC: 16 MG/DL (ref 6–20)
BUN/CREAT SERPL: 15 (ref 12–20)
CA-I BLD-MCNC: 9.2 MG/DL (ref 8.5–10.1)
CHLORIDE SERPL-SCNC: 105 MMOL/L (ref 97–108)
CO2 SERPL-SCNC: 19 MMOL/L (ref 21–32)
CREAT SERPL-MCNC: 1.06 MG/DL (ref 0.55–1.02)
DIFFERENTIAL METHOD BLD: ABNORMAL
EOSINOPHIL # BLD: 0 K/UL (ref 0–0.4)
EOSINOPHIL NFR BLD: 0 % (ref 0–7)
ERYTHROCYTE [DISTWIDTH] IN BLOOD BY AUTOMATED COUNT: 13.9 % (ref 11.5–14.5)
GLOBULIN SER CALC-MCNC: 4.6 G/DL (ref 2–4)
GLUCOSE BLD STRIP.AUTO-MCNC: 314 MG/DL (ref 65–117)
GLUCOSE SERPL-MCNC: 195 MG/DL (ref 65–100)
HCT VFR BLD AUTO: 32.8 % (ref 35–47)
HGB BLD-MCNC: 10.8 G/DL (ref 11.5–16)
IMM GRANULOCYTES # BLD AUTO: 0 K/UL
IMM GRANULOCYTES NFR BLD AUTO: 0 %
INR PPP: 1.9 (ref 0.9–1.1)
LYMPHOCYTES # BLD: 0.4 K/UL (ref 0.8–3.5)
LYMPHOCYTES NFR BLD: 13 % (ref 12–49)
MCH RBC QN AUTO: 32.9 PG (ref 26–34)
MCHC RBC AUTO-ENTMCNC: 32.9 G/DL (ref 30–36.5)
MCV RBC AUTO: 100 FL (ref 80–99)
MONOCYTES # BLD: 0.1 K/UL (ref 0–1)
MONOCYTES NFR BLD: 3 % (ref 5–13)
NEUTS SEG # BLD: 2.8 K/UL (ref 1.8–8)
NEUTS SEG NFR BLD: 84 % (ref 32–75)
NRBC # BLD: 0 K/UL (ref 0–0.01)
NRBC BLD-RTO: 0 PER 100 WBC
PERFORMED BY, TECHID: ABNORMAL
PLATELET # BLD AUTO: 170 K/UL (ref 150–400)
PMV BLD AUTO: 9.7 FL (ref 8.9–12.9)
POTASSIUM SERPL-SCNC: 3.7 MMOL/L (ref 3.5–5.1)
PROT SERPL-MCNC: 7.9 G/DL (ref 6.4–8.2)
PROTHROMBIN TIME: 21.7 SEC (ref 11.9–14.7)
RBC # BLD AUTO: 3.28 M/UL (ref 3.8–5.2)
RBC MORPH BLD: ABNORMAL
SODIUM SERPL-SCNC: 135 MMOL/L (ref 136–145)
WBC # BLD AUTO: 3.3 K/UL (ref 3.6–11)

## 2021-05-12 PROCEDURE — 74011000250 HC RX REV CODE- 250: Performed by: HOSPITALIST

## 2021-05-12 PROCEDURE — 85025 COMPLETE CBC W/AUTO DIFF WBC: CPT

## 2021-05-12 PROCEDURE — 74011250637 HC RX REV CODE- 250/637: Performed by: HOSPITALIST

## 2021-05-12 PROCEDURE — 74011250636 HC RX REV CODE- 250/636: Performed by: HOSPITALIST

## 2021-05-12 PROCEDURE — 82962 GLUCOSE BLOOD TEST: CPT

## 2021-05-12 PROCEDURE — 85610 PROTHROMBIN TIME: CPT

## 2021-05-12 PROCEDURE — 36415 COLL VENOUS BLD VENIPUNCTURE: CPT

## 2021-05-12 PROCEDURE — 80053 COMPREHEN METABOLIC PANEL: CPT

## 2021-05-12 PROCEDURE — 74011000258 HC RX REV CODE- 258: Performed by: HOSPITALIST

## 2021-05-12 PROCEDURE — 65270000029 HC RM PRIVATE

## 2021-05-12 RX ORDER — TRAZODONE HYDROCHLORIDE 50 MG/1
100 TABLET ORAL
Status: DISCONTINUED | OUTPATIENT
Start: 2021-05-13 | End: 2021-05-14 | Stop reason: HOSPADM

## 2021-05-12 RX ORDER — SERTRALINE HYDROCHLORIDE 50 MG/1
50 TABLET, FILM COATED ORAL DAILY
Status: DISCONTINUED | OUTPATIENT
Start: 2021-05-13 | End: 2021-05-14 | Stop reason: HOSPADM

## 2021-05-12 RX ORDER — CLONIDINE HYDROCHLORIDE 0.1 MG/1
0.1 TABLET ORAL 2 TIMES DAILY
Status: DISCONTINUED | OUTPATIENT
Start: 2021-05-13 | End: 2021-05-14 | Stop reason: HOSPADM

## 2021-05-12 RX ORDER — MONTELUKAST SODIUM 10 MG/1
10 TABLET ORAL DAILY
Status: DISCONTINUED | OUTPATIENT
Start: 2021-05-13 | End: 2021-05-14 | Stop reason: HOSPADM

## 2021-05-12 RX ORDER — MAGNESIUM SULFATE 100 %
4 CRYSTALS MISCELLANEOUS AS NEEDED
Status: DISCONTINUED | OUTPATIENT
Start: 2021-05-12 | End: 2021-05-14 | Stop reason: HOSPADM

## 2021-05-12 RX ORDER — INSULIN LISPRO 100 [IU]/ML
INJECTION, SOLUTION INTRAVENOUS; SUBCUTANEOUS
Status: DISCONTINUED | OUTPATIENT
Start: 2021-05-13 | End: 2021-05-14 | Stop reason: HOSPADM

## 2021-05-12 RX ORDER — PRAVASTATIN SODIUM 40 MG/1
40 TABLET ORAL
Status: DISCONTINUED | OUTPATIENT
Start: 2021-05-13 | End: 2021-05-14 | Stop reason: HOSPADM

## 2021-05-12 RX ORDER — GABAPENTIN 100 MG/1
100 CAPSULE ORAL 3 TIMES DAILY
Status: DISCONTINUED | OUTPATIENT
Start: 2021-05-13 | End: 2021-05-14 | Stop reason: HOSPADM

## 2021-05-12 RX ORDER — DILTIAZEM HYDROCHLORIDE 120 MG/1
240 CAPSULE, COATED, EXTENDED RELEASE ORAL DAILY
Status: DISCONTINUED | OUTPATIENT
Start: 2021-05-13 | End: 2021-05-14 | Stop reason: HOSPADM

## 2021-05-12 RX ORDER — THEOPHYLLINE 300 MG/1
300 TABLET, EXTENDED RELEASE ORAL 2 TIMES DAILY
Status: DISCONTINUED | OUTPATIENT
Start: 2021-05-13 | End: 2021-05-14 | Stop reason: HOSPADM

## 2021-05-12 RX ORDER — DEXTROSE 50 % IN WATER (D50W) INTRAVENOUS SYRINGE
25-50 AS NEEDED
Status: DISCONTINUED | OUTPATIENT
Start: 2021-05-12 | End: 2021-05-14 | Stop reason: HOSPADM

## 2021-05-12 RX ORDER — OXYCODONE HYDROCHLORIDE 10 MG/1
10 TABLET ORAL
Status: DISCONTINUED | OUTPATIENT
Start: 2021-05-12 | End: 2021-05-14 | Stop reason: HOSPADM

## 2021-05-12 RX ORDER — ALLOPURINOL 100 MG/1
300 TABLET ORAL DAILY
Status: DISCONTINUED | OUTPATIENT
Start: 2021-05-13 | End: 2021-05-14 | Stop reason: HOSPADM

## 2021-05-12 RX ADMIN — METHYLPREDNISOLONE SODIUM SUCCINATE 40 MG: 40 INJECTION, POWDER, FOR SOLUTION INTRAMUSCULAR; INTRAVENOUS at 06:07

## 2021-05-12 RX ADMIN — Medication 10 ML: at 22:14

## 2021-05-12 RX ADMIN — METHYLPREDNISOLONE SODIUM SUCCINATE 40 MG: 40 INJECTION, POWDER, FOR SOLUTION INTRAMUSCULAR; INTRAVENOUS at 18:12

## 2021-05-12 RX ADMIN — AZITHROMYCIN 500 MG: 500 INJECTION, POWDER, LYOPHILIZED, FOR SOLUTION INTRAVENOUS at 17:45

## 2021-05-12 RX ADMIN — Medication 10 ML: at 01:47

## 2021-05-12 RX ADMIN — PIPERACILLIN AND TAZOBACTAM 3.38 G: 3; .375 INJECTION, POWDER, LYOPHILIZED, FOR SOLUTION INTRAVENOUS at 01:46

## 2021-05-12 RX ADMIN — ACETAMINOPHEN 650 MG: 325 TABLET, FILM COATED ORAL at 02:30

## 2021-05-12 RX ADMIN — WARFARIN SODIUM 7.5 MG: 5 TABLET ORAL at 18:13

## 2021-05-12 RX ADMIN — PIPERACILLIN AND TAZOBACTAM 3.38 G: 3; .375 INJECTION, POWDER, LYOPHILIZED, FOR SOLUTION INTRAVENOUS at 06:07

## 2021-05-12 RX ADMIN — PIPERACILLIN AND TAZOBACTAM 3.38 G: 3; .375 INJECTION, POWDER, LYOPHILIZED, FOR SOLUTION INTRAVENOUS at 12:52

## 2021-05-12 RX ADMIN — METHYLPREDNISOLONE SODIUM SUCCINATE 40 MG: 40 INJECTION, POWDER, FOR SOLUTION INTRAMUSCULAR; INTRAVENOUS at 01:46

## 2021-05-12 RX ADMIN — CEFTRIAXONE 1 G: 1 INJECTION, POWDER, FOR SOLUTION INTRAMUSCULAR; INTRAVENOUS at 17:45

## 2021-05-12 RX ADMIN — METHYLPREDNISOLONE SODIUM SUCCINATE 40 MG: 40 INJECTION, POWDER, FOR SOLUTION INTRAMUSCULAR; INTRAVENOUS at 12:53

## 2021-05-12 RX ADMIN — Medication 10 ML: at 06:08

## 2021-05-12 RX ADMIN — ACETAMINOPHEN 650 MG: 325 TABLET, FILM COATED ORAL at 22:14

## 2021-05-12 NOTE — PROGRESS NOTES
Dual skin assessment performed. Patient has bruising to right arm. There is bilateral LE redness and scaling with no open areas noted. No evidence of skin breakdown.

## 2021-05-12 NOTE — PROGRESS NOTES
Consult for Vancomycin Dosing by Pharmacy by Dr. Melany Fields provided for this 77y.o. year old female , for indication of Sepsis. Day of Therapy: 1  Goal of Level(s): 15-20mcg/dL    Other Current Antibiotics: Zosyn    Patient received Vancomycin 1000 mg dose at 2000 in ED today. Will order Vancomycin 750 mg x 1 dose scheduled to be given at 2000 tomorrow (5/12/21). Will order random level at 1800 on 5/13/21. Will pulse dose due to unstable renal function. Pharmacy to follow daily and will make changes to dose and/or frequency based on clinical status.   _________________________________     Pharmacist Nataly Cabrera PHARMD

## 2021-05-12 NOTE — PROGRESS NOTES
Hospitalist Progress Note               Daily Progress Note: 5/12/2021      Subjective: The patient is seen for follow  up. 33-year-old female was admitted to the hospital with shortness of breath and altered mental status. Patient is found to have Covid test positive. She is saturating well on room air. Her wheezing is better now. No further fever spikes noticed since yesterday. Medications reviewed  Current Facility-Administered Medications   Medication Dose Route Frequency    warfarin (COUMADIN) tablet 7.5 mg  7.5 mg Oral ONCE    WARFARIN INFORMATION NOTE (COUMADIN) 1 Each  1 Each Other Rx Dosing/Monitoring    sodium chloride (NS) flush 5-40 mL  5-40 mL IntraVENous Q8H    sodium chloride (NS) flush 5-40 mL  5-40 mL IntraVENous PRN    acetaminophen (TYLENOL) tablet 650 mg  650 mg Oral Q6H PRN    Or    acetaminophen (TYLENOL) suppository 650 mg  650 mg Rectal Q6H PRN    polyethylene glycol (MIRALAX) packet 17 g  17 g Oral DAILY PRN    promethazine (PHENERGAN) tablet 12.5 mg  12.5 mg Oral Q6H PRN    Or    ondansetron (ZOFRAN) injection 4 mg  4 mg IntraVENous Q6H PRN    piperacillin-tazobactam (ZOSYN) 3.375 g in 0.9% sodium chloride (MBP/ADV) 100 mL MBP  3.375 g IntraVENous Q6H    albuterol-ipratropium (DUO-NEB) 2.5 MG-0.5 MG/3 ML  3 mL Nebulization Q6H PRN    methylPREDNISolone (PF) (SOLU-MEDROL) injection 40 mg  40 mg IntraVENous Q6H    vancomycin (VANCOCIN) 750 mg in 0.9% sodium chloride 250 mL (VIAL-MATE)  750 mg IntraVENous ONCE    [START ON 5/13/2021] VANCOMYCIN Trough at 1800 on 5/13/21   Other ONCE    VANCOMYCIN INFORMATION NOTE 1 Each  1 Each Other Rx Dosing/Monitoring       Review of Systems:   A comprehensive review of systems was negative except for that written in the HPI.     Objective:   Physical Exam:     Visit Vitals  BP (!) 152/69 (BP 1 Location: Right upper arm, BP Patient Position: At rest)   Pulse 72   Temp 97.4 °F (36.3 °C)   Resp 18   Ht 5' 3\" (1.6 m)   Wt 90.7 kg (200 lb)   SpO2 92%   BMI 35.43 kg/m²    O2 Flow Rate (L/min): 3 l/min O2 Device: Nasal cannula    Temp (24hrs), Av.9 °F (36.6 °C), Min:97.4 °F (36.3 °C), Max:98.7 °F (37.1 °C)    No intake/output data recorded. 05/10 1901 -  0700  In: 1000 [I.V.:1000]  Out: 3701 [Urine:3700]    PHYSICAL EXAM:  General: Alert and awake  Skin: Ch skin changes on both lower extremities  HEENT: Sclerae anicteric. Extra-occular muscles are intact. No oral ulcers. No ENT discharge. The neck is supple. Cardiovascular: Regular rate and rhythm. No murmurs, gallops, or rubs. PMI nondisplaced. Carotids without bruits. Respiratory: Comfortable breathing with no accessory muscle use. Clear breath sounds with no wheezes, rales, or rhonchi. GI: Abdomen nondistended, soft, and nontender. Normal active bowel sounds. No enlargement of the liver or spleen. No masses palpable. Rectal: Deferred   Musculoskeletal: No pitting edema of the lower legs. Extremities have good range of motion. No costovertebral tenderness. Neurological: Gross memory appears intact. Patient is alert and oriented. Power 5/5, Cranial nerves II-XII intact  Psychiatric: Mood appears appropriate with judgement intact. Lymphatic: No cervical or supraclavicular adenopathy. Data Review:       Recent Days:  Recent Labs     21  0519 21  1445   WBC 3.3* 3.0*   HGB 10.8* 8.8*   HCT 32.8* 27.3*    138*     Recent Labs     21  0519 21  1123   * 133*   K 3.7 3.8    104   CO2 19* 23   * 140*   BUN 16 19   CREA 1.06* 1.45*   CA 9.2 8.9   MG  --  1.9   ALB 3.3* 3.0*   TBILI 0.6 0.6   ALT 25 22   INR 1.9*  --      No results for input(s): PH, PCO2, PO2, HCO3, FIO2 in the last 72 hours. CT HEAD WO CONT   Final Result   No intracranial hemorrhage. XR CHEST PORT   Final Result   No plain film evidence for CHF or pneumonia. Suspect bone metastases right humerus.              Assessment/     Problem List:  Hospital Problems  Never Reviewed          Codes Class Noted POA    COVID-19 ICD-10-CM: U07.1  ICD-9-CM: 079.89  5/12/2021 Unknown        Fever ICD-10-CM: R50.9  ICD-9-CM: 780.60  5/11/2021 Unknown        Sepsis ([facility] ICD-10-CM: A41.9  ICD-9-CM: 038.9, 995.91  5/11/2021 Unknown        Breast cancer ([facility] ICD-10-CM: C50.919  ICD-9-CM: 174.9  5/11/2021 Unknown        HTN (hypertension) ICD-10-CM: I10  ICD-9-CM: 401.9  5/11/2021 Unknown        Reactive airway disease with acute exacerbation ICD-10-CM: J45.901  ICD-9-CM: 493.92  5/11/2021 Unknown        HLD (hyperlipidemia) ICD-10-CM: E78.5  ICD-9-CM: 272.4  5/11/2021 Unknown        Depression ICD-10-CM: F32.9  ICD-9-CM: 563  5/11/2021 Unknown                     Plan:  68-year-old male was brought to the hospital with a complaint of having shortness of breath and change in mental status. Patient was found to have fever as well. Further work-up revealed that she had Covid infection  1. Covid infection: Patient is on Solu-Medrol we will switch her to dexamethasone. We will switch the patient to Rocephin and Zithromax. Will hold off on starting she does not meet criteria for remdesivir at this time. We will continue monitor patient's oxygen saturation closely and give oxygen supplementation as needed. 2.  Reactive airway disease with acute exacerbation likely due to Covid. Continue patient on nebulization treatments. Continue on IV steroids. 3.  Hypertension: Continue patient on clonidine and Cardizem  4. Hyperlipidemia: Continue patient on simvastatin  5. Depression: Continue patient on Zoloft  6. History of breast cancer: Continue patient on Verzenio.   Surrogate decision maker is patient's daughter Kady Bautista phone #[phone]      Care Plan discussed with: Patient/Family, Nurse and       Maricarmen Chung MD

## 2021-05-12 NOTE — ROUTINE PROCESS
TRANSFER - OUT REPORT: 
 
Verbal report given to [facility], RN (name) on Geofm Davenport  being transferred to [facility] (unit) for routine progression of care Report consisted of patients Situation, Background, Assessment and  
Recommendations(SBAR). Information from the following report(s) SBAR, Kardex, ED Summary, [facility] and Recent Results was reviewed with the receiving nurse. Lines:  
Venous Access Device 05/11/21 Upper chest (subclavicular area, right (Active) Opportunity for questions and clarification was provided. Patient transported with: 
 Tech, personal belongings, pt chart

## 2021-05-12 NOTE — PROGRESS NOTES
Consult for Warfarin Dosing by Pharmacy by Dr. Yao Amor provided for this 77 y.o.  female , for indication of thromboembolism prevention in chronic atrial fibrillation. Day of Therapy: 1  INR Goal: 2-3    Order entered for  Warfarin  7.5 mg ordered to be given today at 18:00. Significant drug interactions: methylprednisolone  Previous dose given    PT/INR INR   Date Value Ref Range Status   05/12/2021 1.9 (H) 0.9 - 1.1   Final      Platelets Lab Results   Component Value Date/Time    PLATELET 692 91/10/8687 05:19 AM      H/H Lab Results   Component Value Date/Time    HGB 10.8 (L) 05/12/2021 05:19 AM        Patient home dose of warfarin 7.5mg (M-F) and 5mg (Sat-Sun). Pharmacy to follow daily and will provide subsequent Warfarin dosing based on clinical status.   _________________________________     Pharmacist Linda Arriaga, [facility]

## 2021-05-12 NOTE — PROGRESS NOTES
Physician Progress Note      Castro Huitron  [facility] #:                  [id number]  :                       1955  ADMIT DATE:       2021 11:03 AM  DISCH DATE:  RESPONDING  PROVIDER #:        Isidro Martin MD          QUERY TEXT:    Dear Attending,    Pt admitted with fever, cough, lethargy and noted to have Detected COVID-19 test on 2021. Please document in progress notes and discharge summary if you are evaluating or treating any of the following: The medical record reflects the following:  Risk Factors: 77year old female, past medical history significant COPD and active breast cancer with bone metastases, currently receiving chemotherapy. Reactive airway disease with acute exacerbation  Clinical Indicators:  ED provider note - presents to the ED with cc of lethargy and fever onset yesterday. Cough noted on patient's arrival.   H&P - came to ER with a complaint of worsening shortness of breath for about a week now. Patient was also very lethargic at home. Upon arrival to the ER patient was found to have feverTemperature 101.6 ? F.   COVID-19 Rapid test: DETECTED   CXR - No plain film evidence for CHF or pneumonia. Suspect bone metastases right humerus. Treatment: Oxygen 1-4L nasal cannula, Droplet precautions,    Please call [phone] with any questions  Options provided:  -- Acute bronchitis due to COVID-19  -- Acute lower respiratory infection due to COVID-19  -- ARDS due to COVID-19  -- Pneumonia due to COVID-19  -- Other - I will add my own diagnosis  -- Disagree - Not applicable / Not valid  -- Disagree - Clinically unable to determine / Unknown  -- Refer to Clinical Documentation Reviewer    PROVIDER RESPONSE TEXT:    This patient has reactive airway due to COVID-19. Query created by:  Amita Rose on 2021 11:00 AM      Electronically signed by:  Isidro Martin MD 2021 1:20 PM

## 2021-05-12 NOTE — PROGRESS NOTES
Problem: Airway Clearance - Ineffective  Goal: Achieve or maintain patent airway  Outcome: Progressing Towards Goal     Problem: Gas Exchange - Impaired  Goal: Absence of hypoxia  Outcome: Progressing Towards Goal  Goal: Promote optimal lung function  Outcome: Progressing Towards Goal     Problem: Breathing Pattern - Ineffective  Goal: Ability to achieve and maintain a regular respiratory rate  Outcome: Progressing Towards Goal     Problem:  Body Temperature -  Risk of, Imbalanced  Goal: Ability to maintain a body temperature within defined limits  Outcome: Progressing Towards Goal  Goal: Will regain or maintain usual level of consciousness  Outcome: Progressing Towards Goal  Goal: Complications related to the disease process, condition or treatment will be avoided or minimized  Outcome: Progressing Towards Goal     Problem: Isolation Precautions - Risk of Spread of Infection  Goal: Prevent transmission of infectious organism to others  Outcome: Progressing Towards Goal     Problem: Nutrition Deficits  Goal: Optimize nutrtional status  Outcome: Progressing Towards Goal     Problem: Risk for Fluid Volume Deficit  Goal: Maintain normal heart rhythm  Outcome: Progressing Towards Goal  Goal: Maintain absence of muscle cramping  Outcome: Progressing Towards Goal  Goal: Maintain normal serum potassium, sodium, calcium, phosphorus, and pH  Outcome: Progressing Towards Goal     Problem: Loneliness or Risk for Loneliness  Goal: Demonstrate positive use of time alone when socialization is not possible  Outcome: Progressing Towards Goal     Problem: Fatigue  Goal: Verbalize increase energy and improved vitality  Outcome: Progressing Towards Goal     Problem: Patient Education: Go to Patient Education Activity  Goal: Patient/Family Education  Outcome: Progressing Towards Goal     Problem: Pressure Injury - Risk of  Goal: *Prevention of pressure injury  Description: Document Benoit Scale and appropriate interventions in the flowsheet. Outcome: Progressing Towards Goal  Note: Pressure Injury Interventions:  Sensory Interventions: Assess changes in LOC, Assess need for specialty bed, Keep linens dry and wrinkle-free, Maintain/enhance activity level, Minimize linen layers    Moisture Interventions: Absorbent underpads, Apply protective barrier, creams and emollients, Minimize layers, Maintain skin hydration (lotion/cream)    Activity Interventions: PT/OT evaluation, Increase time out of bed, Assess need for specialty bed    Mobility Interventions: Assess need for specialty bed, HOB 30 degrees or less, PT/OT evaluation, Turn and reposition approx. every two hours(pillow and wedges)    Nutrition Interventions: Document food/fluid/supplement intake, Offer support with meals,snacks and hydration    Friction and Shear Interventions: Apply protective barrier, creams and emollients, HOB 30 degrees or less, Minimize layers                Problem: Patient Education: Go to Patient Education Activity  Goal: Patient/Family Education  Outcome: Progressing Towards Goal     Problem: Falls - Risk of  Goal: *Absence of Falls  Description: Document Preston Fall Risk and appropriate interventions in the flowsheet.   Outcome: Progressing Towards Goal     Problem: Patient Education: Go to Patient Education Activity  Goal: Patient/Family Education  Outcome: Progressing Towards Goal

## 2021-05-12 NOTE — PROGRESS NOTES
Admission skin assessment performed by Valentino Oaks RN and Carie Fuentes RN. Bilateral lower leg redness noted. Bruising noted on right arm and right hand. Blanchable redness noted on sacrum. No other skin abnormalities noted at this time.

## 2021-05-13 LAB
GLUCOSE BLD STRIP.AUTO-MCNC: 213 MG/DL (ref 65–117)
GLUCOSE BLD STRIP.AUTO-MCNC: 234 MG/DL (ref 65–117)
GLUCOSE BLD STRIP.AUTO-MCNC: 239 MG/DL (ref 65–117)
GLUCOSE BLD STRIP.AUTO-MCNC: 248 MG/DL (ref 65–117)
GLUCOSE BLD STRIP.AUTO-MCNC: 277 MG/DL (ref 65–117)
INR PPP: 2.2 (ref 0.9–1.1)
PERFORMED BY, TECHID: ABNORMAL
PROTHROMBIN TIME: 24.5 SEC (ref 11.9–14.7)

## 2021-05-13 PROCEDURE — 74011000250 HC RX REV CODE- 250: Performed by: HOSPITALIST

## 2021-05-13 PROCEDURE — 74011636637 HC RX REV CODE- 636/637: Performed by: HOSPITALIST

## 2021-05-13 PROCEDURE — 85610 PROTHROMBIN TIME: CPT

## 2021-05-13 PROCEDURE — 74011250636 HC RX REV CODE- 250/636: Performed by: HOSPITALIST

## 2021-05-13 PROCEDURE — 74011250637 HC RX REV CODE- 250/637: Performed by: HOSPITALIST

## 2021-05-13 PROCEDURE — 65270000029 HC RM PRIVATE

## 2021-05-13 PROCEDURE — 82962 GLUCOSE BLOOD TEST: CPT

## 2021-05-13 PROCEDURE — 36415 COLL VENOUS BLD VENIPUNCTURE: CPT

## 2021-05-13 RX ORDER — DEXAMETHASONE SODIUM PHOSPHATE 10 MG/ML
10 INJECTION INTRAMUSCULAR; INTRAVENOUS EVERY 12 HOURS
Status: DISCONTINUED | OUTPATIENT
Start: 2021-05-13 | End: 2021-05-14 | Stop reason: HOSPADM

## 2021-05-13 RX ORDER — WARFARIN SODIUM 5 MG/1
5 TABLET ORAL ONCE
Status: COMPLETED | OUTPATIENT
Start: 2021-05-13 | End: 2021-05-13

## 2021-05-13 RX ADMIN — GABAPENTIN 100 MG: 100 CAPSULE ORAL at 21:11

## 2021-05-13 RX ADMIN — GABAPENTIN 100 MG: 100 CAPSULE ORAL at 01:07

## 2021-05-13 RX ADMIN — CEFTRIAXONE 1 G: 1 INJECTION, POWDER, FOR SOLUTION INTRAMUSCULAR; INTRAVENOUS at 16:12

## 2021-05-13 RX ADMIN — PRAVASTATIN SODIUM 40 MG: 40 TABLET ORAL at 01:11

## 2021-05-13 RX ADMIN — DILTIAZEM HYDROCHLORIDE 240 MG: 120 CAPSULE, COATED, EXTENDED RELEASE ORAL at 10:15

## 2021-05-13 RX ADMIN — GABAPENTIN 100 MG: 100 CAPSULE ORAL at 10:15

## 2021-05-13 RX ADMIN — MONTELUKAST 10 MG: 10 TABLET, FILM COATED ORAL at 10:15

## 2021-05-13 RX ADMIN — OXYCODONE HYDROCHLORIDE 10 MG: 10 TABLET ORAL at 21:29

## 2021-05-13 RX ADMIN — CLONIDINE HYDROCHLORIDE 0.1 MG: 0.1 TABLET ORAL at 21:11

## 2021-05-13 RX ADMIN — INSULIN LISPRO 6 UNITS: 100 INJECTION, SOLUTION INTRAVENOUS; SUBCUTANEOUS at 12:42

## 2021-05-13 RX ADMIN — AZITHROMYCIN 500 MG: 500 INJECTION, POWDER, LYOPHILIZED, FOR SOLUTION INTRAVENOUS at 16:12

## 2021-05-13 RX ADMIN — METHYLPREDNISOLONE SODIUM SUCCINATE 40 MG: 40 INJECTION, POWDER, FOR SOLUTION INTRAMUSCULAR; INTRAVENOUS at 12:42

## 2021-05-13 RX ADMIN — INSULIN LISPRO 4 UNITS: 100 INJECTION, SOLUTION INTRAVENOUS; SUBCUTANEOUS at 10:15

## 2021-05-13 RX ADMIN — ALLOPURINOL 300 MG: 100 TABLET ORAL at 10:15

## 2021-05-13 RX ADMIN — TRAZODONE HYDROCHLORIDE 100 MG: 50 TABLET ORAL at 01:08

## 2021-05-13 RX ADMIN — METHYLPREDNISOLONE SODIUM SUCCINATE 40 MG: 40 INJECTION, POWDER, FOR SOLUTION INTRAMUSCULAR; INTRAVENOUS at 01:09

## 2021-05-13 RX ADMIN — SERTRALINE HYDROCHLORIDE 50 MG: 50 TABLET ORAL at 10:15

## 2021-05-13 RX ADMIN — THEOPHYLLINE 300 MG: 300 TABLET, EXTENDED RELEASE ORAL at 01:07

## 2021-05-13 RX ADMIN — CLONIDINE HYDROCHLORIDE 0.1 MG: 0.1 TABLET ORAL at 01:08

## 2021-05-13 RX ADMIN — DEXAMETHASONE SODIUM PHOSPHATE 10 MG: 10 INJECTION INTRAMUSCULAR; INTRAVENOUS at 21:19

## 2021-05-13 RX ADMIN — WARFARIN SODIUM 5 MG: 5 TABLET ORAL at 18:25

## 2021-05-13 RX ADMIN — Medication 10 ML: at 06:26

## 2021-05-13 RX ADMIN — INSULIN LISPRO 4 UNITS: 100 INJECTION, SOLUTION INTRAVENOUS; SUBCUTANEOUS at 22:00

## 2021-05-13 RX ADMIN — METHYLPREDNISOLONE SODIUM SUCCINATE 40 MG: 40 INJECTION, POWDER, FOR SOLUTION INTRAMUSCULAR; INTRAVENOUS at 06:26

## 2021-05-13 RX ADMIN — GABAPENTIN 100 MG: 100 CAPSULE ORAL at 16:12

## 2021-05-13 RX ADMIN — CLONIDINE HYDROCHLORIDE 0.1 MG: 0.1 TABLET ORAL at 10:15

## 2021-05-13 RX ADMIN — THEOPHYLLINE 300 MG: 300 TABLET, EXTENDED RELEASE ORAL at 10:15

## 2021-05-13 RX ADMIN — Medication 10 ML: at 21:20

## 2021-05-13 RX ADMIN — DEXAMETHASONE SODIUM PHOSPHATE 10 MG: 10 INJECTION INTRAMUSCULAR; INTRAVENOUS at 16:12

## 2021-05-13 RX ADMIN — INSULIN LISPRO 4 UNITS: 100 INJECTION, SOLUTION INTRAVENOUS; SUBCUTANEOUS at 16:30

## 2021-05-13 RX ADMIN — PRAVASTATIN SODIUM 40 MG: 40 TABLET ORAL at 21:11

## 2021-05-13 RX ADMIN — THEOPHYLLINE 300 MG: 300 TABLET, EXTENDED RELEASE ORAL at 21:11

## 2021-05-13 RX ADMIN — TRAZODONE HYDROCHLORIDE 100 MG: 50 TABLET ORAL at 21:11

## 2021-05-13 RX ADMIN — INSULIN LISPRO 4 UNITS: 100 INJECTION, SOLUTION INTRAVENOUS; SUBCUTANEOUS at 01:09

## 2021-05-13 NOTE — PROGRESS NOTES
Patient states that she has \"two ingrown toenails that have been bothering her for about eight months\". Patient states that they are causing a lot of pain. Patient cannot have bedsheets touching feet. Patient requesting podiatry.

## 2021-05-13 NOTE — PROGRESS NOTES
Consult for Warfarin Dosing by Pharmacy by Dr. Sara Durham provided for this 77 y.o.  female , for indication of A. fib    Day of Therapy: 2  INR Goal: 2-3    Order entered for  Warfarin  5 (mg) ordered to be given today at 18:00. Significant drug interactions: Azithromycin    Previous dose given 7.5mg   PT/INR INR   Date Value Ref Range Status   05/13/2021 2.2 (H) 0.9 - 1.1   Final   05/12/2021 1.9 (H) 0.9 - 1.1   Final      Platelets Lab Results   Component Value Date/Time    PLATELET 114 07/74/7303 05:19 AM      H/H Lab Results   Component Value Date/Time    HGB 10.8 (L) 05/12/2021 05:19 AM        Pharmacy to follow daily and will provide subsequent Warfarin dosing based on clinical status.   _________________________________     Pharmacist Gudelia Chacon

## 2021-05-13 NOTE — PROGRESS NOTES
Bedside shift change report given to Ed Alvaro RN (oncoming nurse) by Mike Walters (offgoing nurse). Report included the following information SBAR, Kardex, MAR, Recent Results.

## 2021-05-13 NOTE — PROGRESS NOTES
Hospitalist Progress Note               Daily Progress Note: 5/13/2021      Subjective: The patient is seen for follow  up. 78-year-old female was admitted to the hospital with shortness of breath and altered mental status. Patient is found to have Covid test positive. She is saturating well on room air. Her wheezing is better now. No further fever spikes noticed.  She complains of wheezing today  Medications reviewed  Current Facility-Administered Medications   Medication Dose Route Frequency    dexamethasone (PF) (DECADRON) 10 mg/mL injection 10 mg  10 mg IntraVENous Q12H    abemaciclib tab 100 mg (Patient Supplied)  100 mg Oral BID    allopurinoL (ZYLOPRIM) tablet 300 mg  300 mg Oral DAILY    cloNIDine HCL (CATAPRES) tablet 0.1 mg  0.1 mg Oral BID    dilTIAZem ER (CARDIZEM CD) capsule 240 mg  240 mg Oral DAILY    gabapentin (NEURONTIN) capsule 100 mg  100 mg Oral TID    montelukast (SINGULAIR) tablet 10 mg  10 mg Oral DAILY    oxyCODONE IR (ROXICODONE) tablet 10 mg  10 mg Oral Q6H PRN    pravastatin (PRAVACHOL) tablet 40 mg  40 mg Oral QHS    sertraline (ZOLOFT) tablet 50 mg  50 mg Oral DAILY    theophylline ER(12 hour) (THEOCHRON) tablet 300 mg  300 mg Oral BID    traZODone (DESYREL) tablet 100 mg  100 mg Oral QHS    WARFARIN INFORMATION NOTE (COUMADIN) 1 Each  1 Each Other Rx Dosing/Monitoring    cefTRIAXone (ROCEPHIN) 1 g in sterile water (preservative free) 10 mL IV syringe  1 g IntraVENous Q24H    azithromycin (ZITHROMAX) 500 mg in 0.9% sodium chloride 250 mL (VIAL-MATE)  500 mg IntraVENous Q24H    insulin lispro (HUMALOG) injection   SubCUTAneous AC&HS    glucose chewable tablet 16 g  4 Tab Oral PRN    glucagon (GLUCAGEN) injection 1 mg  1 mg IntraMUSCular PRN    dextrose (D50W) injection syrg 12.5-25 g  25-50 mL IntraVENous PRN    sodium chloride (NS) flush 5-40 mL  5-40 mL IntraVENous Q8H    sodium chloride (NS) flush 5-40 mL  5-40 mL IntraVENous PRN    acetaminophen (TYLENOL) tablet 650 mg  650 mg Oral Q6H PRN    Or    acetaminophen (TYLENOL) suppository 650 mg  650 mg Rectal Q6H PRN    polyethylene glycol (MIRALAX) packet 17 g  17 g Oral DAILY PRN    promethazine (PHENERGAN) tablet 12.5 mg  12.5 mg Oral Q6H PRN    Or    ondansetron (ZOFRAN) injection 4 mg  4 mg IntraVENous Q6H PRN    albuterol-ipratropium (DUO-NEB) 2.5 MG-0.5 MG/3 ML  3 mL Nebulization Q6H PRN       Review of Systems:   A comprehensive review of systems was negative except for that written in the HPI. Objective:   Physical Exam:     Visit Vitals  BP (!) 148/51 (BP 1 Location: Right upper arm, BP Patient Position: At rest)   Pulse 68   Temp 97.4 °F (36.3 °C)   Resp 20   Ht 5' 3\" (1.6 m)   Wt 106.5 kg (234 lb 11.2 oz)   SpO2 96%   BMI 41.58 kg/m²    O2 Flow Rate (L/min): 3 l/min O2 Device: None (Room air)    Temp (24hrs), Av.3 °F (36.8 °C), Min:97.4 °F (36.3 °C), Max:98.7 °F (37.1 °C)    No intake/output data recorded.  1901 -  0700  In: -   Out: 4600 [Urine:4600]    PHYSICAL EXAM:  General: Alert and awake  Skin: Ch skin changes on both lower extremities  HEENT: Sclerae anicteric. Extra-occular muscles are intact. No oral ulcers. No ENT discharge. The neck is supple. Cardiovascular: Regular rate and rhythm. No murmurs, gallops, or rubs. PMI nondisplaced. Carotids without bruits. Respiratory: Comfortable breathing with no accessory muscle use. +ve wheezes, No  rales, or rhonchi. GI: Abdomen nondistended, soft, and nontender. Normal active bowel sounds. No enlargement of the liver or spleen. No masses palpable. Rectal: Deferred   Musculoskeletal: No pitting edema of the lower legs. Extremities have good range of motion. No costovertebral tenderness. Neurological: Gross memory appears intact. Patient is alert and oriented. Power 5/5, Cranial nerves II-XII intact  Psychiatric: Mood appears appropriate with judgement intact.    Lymphatic: No cervical or supraclavicular adenopathy. Data Review:       Recent Days:  Recent Labs     05/12/21  0519 05/11/21  1445   WBC 3.3* 3.0*   HGB 10.8* 8.8*   HCT 32.8* 27.3*    138*     Recent Labs     05/13/21  0604 05/12/21  0519 05/11/21  1123   NA  --  135* 133*   K  --  3.7 3.8   CL  --  105 104   CO2  --  19* 23   GLU  --  195* 140*   BUN  --  16 19   CREA  --  1.06* 1.45*   CA  --  9.2 8.9   MG  --   --  1.9   ALB  --  3.3* 3.0*   TBILI  --  0.6 0.6   ALT  --  25 22   INR 2.2* 1.9*  --      No results for input(s): PH, PCO2, PO2, HCO3, FIO2 in the last 72 hours. CT HEAD WO CONT   Final Result   No intracranial hemorrhage. XR CHEST PORT   Final Result   No plain film evidence for CHF or pneumonia. Suspect bone metastases right humerus. Assessment/     Problem List:  Hospital Problems  Never Reviewed          Codes Class Noted POA    COVID-19 ICD-10-CM: U07.1  ICD-9-CM: 079.89  5/12/2021 Unknown        Fever ICD-10-CM: R50.9  ICD-9-CM: 780.60  5/11/2021 Unknown        Sepsis ([facility] ICD-10-CM: A41.9  ICD-9-CM: 038.9, 995.91  5/11/2021 Unknown        Breast cancer ([facility] ICD-10-CM: C50.919  ICD-9-CM: 174.9  5/11/2021 Unknown        HTN (hypertension) ICD-10-CM: I10  ICD-9-CM: 401.9  5/11/2021 Unknown        Reactive airway disease with acute exacerbation ICD-10-CM: J45.901  ICD-9-CM: 493.92  5/11/2021 Unknown        HLD (hyperlipidemia) ICD-10-CM: E78.5  ICD-9-CM: 272.4  5/11/2021 Unknown        Depression ICD-10-CM: F32.9  ICD-9-CM: 219  5/11/2021 Unknown                     Plan:  67-year-old male was brought to the hospital with a complaint of having shortness of breath and change in mental status. Patient was found to have fever as well. Further work-up revealed that she had Covid infection  1. Covid infection: Patient is on Solu-Medrol we will switch her to dexamethasone. We have switched the patient to Rocephin and Zithromax.   Will hold off on starting she does not meet criteria for remdesivir at this time.  We will continue monitor patient's oxygen saturation closely and give oxygen supplementation as needed. 2.  Reactive airway disease with acute exacerbation likely due to Covid. Continue patient on nebulization treatments. Continue on IV steroids. 3.  Hypertension: Continue patient on clonidine and Cardizem  4. Hyperlipidemia: Continue patient on simvastatin  5. Depression: Continue patient on Zoloft  6. History of breast cancer: Continue patient on Verzenio.   Surrogate decision maker is patient's daughter Russ Ramos phone #[phone]      Care Plan discussed with: Patient/Family, Nurse and       Santhosh Oneill MD

## 2021-05-14 VITALS
HEIGHT: 63 IN | WEIGHT: 234.7 LBS | OXYGEN SATURATION: 93 % | HEART RATE: 64 BPM | RESPIRATION RATE: 18 BRPM | BODY MASS INDEX: 41.59 KG/M2 | SYSTOLIC BLOOD PRESSURE: 147 MMHG | DIASTOLIC BLOOD PRESSURE: 62 MMHG | TEMPERATURE: 98.9 F

## 2021-05-14 LAB
ANION GAP SERPL CALC-SCNC: 8 MMOL/L (ref 5–15)
BACTERIA SPEC CULT: ABNORMAL
BASOPHILS # BLD: 0 K/UL (ref 0–0.1)
BASOPHILS NFR BLD: 0 % (ref 0–1)
BUN SERPL-MCNC: 22 MG/DL (ref 6–20)
BUN/CREAT SERPL: 16 (ref 12–20)
CA-I BLD-MCNC: 8.8 MG/DL (ref 8.5–10.1)
CHLORIDE SERPL-SCNC: 104 MMOL/L (ref 97–108)
CO2 SERPL-SCNC: 21 MMOL/L (ref 21–32)
COLONY COUNT,CNT: ABNORMAL
CREAT SERPL-MCNC: 1.38 MG/DL (ref 0.55–1.02)
DIFFERENTIAL METHOD BLD: ABNORMAL
EOSINOPHIL # BLD: 0 K/UL (ref 0–0.4)
EOSINOPHIL NFR BLD: 0 % (ref 0–7)
ERYTHROCYTE [DISTWIDTH] IN BLOOD BY AUTOMATED COUNT: 14.2 % (ref 11.5–14.5)
GLUCOSE BLD STRIP.AUTO-MCNC: 220 MG/DL (ref 65–117)
GLUCOSE BLD STRIP.AUTO-MCNC: 260 MG/DL (ref 65–117)
GLUCOSE SERPL-MCNC: 241 MG/DL (ref 65–100)
HCT VFR BLD AUTO: 33.4 % (ref 35–47)
HGB BLD-MCNC: 11.2 G/DL (ref 11.5–16)
IMM GRANULOCYTES # BLD AUTO: 0.1 K/UL (ref 0–0.04)
IMM GRANULOCYTES NFR BLD AUTO: 1 % (ref 0–0.5)
INR PPP: 2.4 (ref 0.9–1.1)
LYMPHOCYTES # BLD: 0.5 K/UL (ref 0.8–3.5)
LYMPHOCYTES NFR BLD: 9 % (ref 12–49)
MCH RBC QN AUTO: 32.7 PG (ref 26–34)
MCHC RBC AUTO-ENTMCNC: 33.5 G/DL (ref 30–36.5)
MCV RBC AUTO: 97.7 FL (ref 80–99)
MONOCYTES # BLD: 0.2 K/UL (ref 0–1)
MONOCYTES NFR BLD: 4 % (ref 5–13)
NEUTS SEG # BLD: 4.8 K/UL (ref 1.8–8)
NEUTS SEG NFR BLD: 86 % (ref 32–75)
NRBC # BLD: 0 K/UL (ref 0–0.01)
NRBC BLD-RTO: 0 PER 100 WBC
PERFORMED BY, TECHID: ABNORMAL
PERFORMED BY, TECHID: ABNORMAL
PLATELET # BLD AUTO: 205 K/UL (ref 150–400)
PMV BLD AUTO: 9.8 FL (ref 8.9–12.9)
POTASSIUM SERPL-SCNC: 3 MMOL/L (ref 3.5–5.1)
PROTHROMBIN TIME: 26.2 SEC (ref 11.9–14.7)
RBC # BLD AUTO: 3.42 M/UL (ref 3.8–5.2)
SODIUM SERPL-SCNC: 133 MMOL/L (ref 136–145)
SPECIAL REQUESTS,SREQ: ABNORMAL
WBC # BLD AUTO: 5.6 K/UL (ref 3.6–11)

## 2021-05-14 PROCEDURE — 85025 COMPLETE CBC W/AUTO DIFF WBC: CPT

## 2021-05-14 PROCEDURE — 36415 COLL VENOUS BLD VENIPUNCTURE: CPT

## 2021-05-14 PROCEDURE — 82962 GLUCOSE BLOOD TEST: CPT

## 2021-05-14 PROCEDURE — 74011250637 HC RX REV CODE- 250/637: Performed by: HOSPITALIST

## 2021-05-14 PROCEDURE — 93970 EXTREMITY STUDY: CPT | Performed by: SURGERY

## 2021-05-14 PROCEDURE — 85610 PROTHROMBIN TIME: CPT

## 2021-05-14 PROCEDURE — 74011250636 HC RX REV CODE- 250/636: Performed by: HOSPITALIST

## 2021-05-14 PROCEDURE — 80048 BASIC METABOLIC PNL TOTAL CA: CPT

## 2021-05-14 PROCEDURE — 74011250636 HC RX REV CODE- 250/636

## 2021-05-14 PROCEDURE — 74011636637 HC RX REV CODE- 636/637: Performed by: HOSPITALIST

## 2021-05-14 RX ORDER — DEXAMETHASONE 4 MG/1
4 TABLET ORAL 2 TIMES DAILY WITH MEALS
Qty: 10 TAB | Refills: 0 | Status: SHIPPED | OUTPATIENT
Start: 2021-05-14 | End: 2021-05-19

## 2021-05-14 RX ORDER — AZITHROMYCIN 500 MG/1
500 TABLET, FILM COATED ORAL DAILY
Qty: 3 TAB | Refills: 0 | Status: SHIPPED | OUTPATIENT
Start: 2021-05-14 | End: 2021-05-14 | Stop reason: SDUPTHER

## 2021-05-14 RX ORDER — ACETAMINOPHEN 325 MG/1
650 TABLET ORAL
Qty: 30 TAB | Refills: 0 | Status: SHIPPED | OUTPATIENT
Start: 2021-05-14

## 2021-05-14 RX ORDER — AZITHROMYCIN 500 MG/1
500 TABLET, FILM COATED ORAL DAILY
Qty: 3 TAB | Refills: 0 | Status: SHIPPED | OUTPATIENT
Start: 2021-05-14 | End: 2021-05-17

## 2021-05-14 RX ORDER — WARFARIN SODIUM 5 MG/1
5 TABLET ORAL ONCE
Status: DISCONTINUED | OUTPATIENT
Start: 2021-05-14 | End: 2021-05-14 | Stop reason: HOSPADM

## 2021-05-14 RX ORDER — HEPARIN SODIUM 5000 [USP'U]/ML
INJECTION, SOLUTION INTRAVENOUS; SUBCUTANEOUS
Status: COMPLETED
Start: 2021-05-14 | End: 2021-05-14

## 2021-05-14 RX ORDER — DEXAMETHASONE 4 MG/1
4 TABLET ORAL 2 TIMES DAILY WITH MEALS
Qty: 10 TAB | Refills: 0 | Status: SHIPPED | OUTPATIENT
Start: 2021-05-14 | End: 2021-05-14 | Stop reason: SDUPTHER

## 2021-05-14 RX ORDER — HEPARIN 100 UNIT/ML
300 SYRINGE INTRAVENOUS AS NEEDED
Status: CANCELLED | OUTPATIENT
Start: 2021-05-14

## 2021-05-14 RX ORDER — HEPARIN 100 UNIT/ML
300 SYRINGE INTRAVENOUS AS NEEDED
Status: DISCONTINUED | OUTPATIENT
Start: 2021-05-14 | End: 2021-05-14 | Stop reason: HOSPADM

## 2021-05-14 RX ORDER — CEPHALEXIN 500 MG/1
500 CAPSULE ORAL 3 TIMES DAILY
Qty: 21 CAP | Refills: 0 | Status: SHIPPED | OUTPATIENT
Start: 2021-05-14 | End: 2021-05-21

## 2021-05-14 RX ADMIN — DEXAMETHASONE SODIUM PHOSPHATE 10 MG: 10 INJECTION INTRAMUSCULAR; INTRAVENOUS at 08:14

## 2021-05-14 RX ADMIN — INSULIN LISPRO 4 UNITS: 100 INJECTION, SOLUTION INTRAVENOUS; SUBCUTANEOUS at 11:55

## 2021-05-14 RX ADMIN — HEPARIN SODIUM: 5000 INJECTION INTRAVENOUS; SUBCUTANEOUS at 13:00

## 2021-05-14 RX ADMIN — Medication 10 ML: at 04:06

## 2021-05-14 RX ADMIN — SERTRALINE HYDROCHLORIDE 50 MG: 50 TABLET ORAL at 08:13

## 2021-05-14 RX ADMIN — MONTELUKAST 10 MG: 10 TABLET, FILM COATED ORAL at 08:13

## 2021-05-14 RX ADMIN — DILTIAZEM HYDROCHLORIDE 240 MG: 120 CAPSULE, COATED, EXTENDED RELEASE ORAL at 08:14

## 2021-05-14 RX ADMIN — OXYCODONE HYDROCHLORIDE 10 MG: 10 TABLET ORAL at 08:24

## 2021-05-14 RX ADMIN — THEOPHYLLINE 300 MG: 300 TABLET, EXTENDED RELEASE ORAL at 08:13

## 2021-05-14 RX ADMIN — INSULIN LISPRO 6 UNITS: 100 INJECTION, SOLUTION INTRAVENOUS; SUBCUTANEOUS at 08:14

## 2021-05-14 RX ADMIN — GABAPENTIN 100 MG: 100 CAPSULE ORAL at 08:13

## 2021-05-14 RX ADMIN — CLONIDINE HYDROCHLORIDE 0.1 MG: 0.1 TABLET ORAL at 08:14

## 2021-05-14 RX ADMIN — ALLOPURINOL 300 MG: 100 TABLET ORAL at 08:13

## 2021-05-14 NOTE — DISCHARGE SUMMARY
HOSPITALIST DISCHARGE SUMMARY    NAME: Clementine Tejada   :  1955   MRN:  [id number]     Date/Time:  2021 9:32 AM    DISCHARGE DIAGNOSIS:  Active Problems:    Fever (2021)      Sepsis ([facility] (2021)      Breast cancer ([facility] (2021)      HTN (hypertension) (2021)      Reactive airway disease with acute exacerbation (2021)      HLD (hyperlipidemia) (2021)      Depression (2021)      COVID-19 (2021)    UTI    Admission Diagnosis:  Fever  Sepsis  COVID  UTI    Procedures: see electronic medical records for all procedures/Xrays and details which were not copied into this note but were reviewed prior to creation of Plan. Please follow-up tests/labs that are still pendin. None     DISCHARGE SUMMARY/HOSPITAL COURSE: for full details see H&P, daily progress notes, labs, consult notes. Briefly As Per HPI:  Clementine Tejada is a 77 y.o. female who history of breast cancer with metastasis, hyperlipidemia, hypertension, reactive airway disease came to ER with a complaint of worsening shortness of breath for about a week now. Patient was also very lethargic at home. Upon arrival to the ER patient was found to have feverTemperature 101.6 °F.  Patient does not remember any fevers at home. Patient has a watery bowel movement in ER which was quite foul-smelling. She denies any abdominal pain nausea or vomiting. Patient is found to have Covid test positive. Patient had received 1 dose of covid vaccine PTA. She is saturating well on room air. Her wheezing is better now. No further fever spikes noticed. She denies any wheezing and feels back to baseline.   _______________________________________________________________________   Patient seen and examined by me on day of discharge.   Pertinent findings are:  Vitals:  Visit Vitals  BP (!) 147/62   Pulse 64   Temp 98.9 °F (37.2 °C)   Resp 18   Ht 5' 3\" (1.6 m)   Wt 106.5 kg (234 lb 11.2 oz)   SpO2 93%   BMI 41.58 kg/m²     Temp (24hrs), Av.5 °F (36.9 °C), Min:97.9 °F (36.6 °C), Max:98.9 °F (37.2 °C)      Last 24hr Input/Output:  No intake or output data in the 24 hours ending 21 0932     PHYSICAL EXAM:   General: Alert and awake  Skin: Extremities and face reveal no rashes. No garay erythema. No telangiectasias on the chest wall. HEENT: Sclerae anicteric. Extra-occular muscles are intact. No oral ulcers. No ENT discharge. The neck is supple. Cardiovascular: Regular rate and rhythm. No murmurs, gallops, or rubs. PMI nondisplaced. Carotids without bruits. Respiratory: Comfortable breathing with no accessory muscle use. Clear breath sounds with no wheezes, rales, or rhonchi. GI: Abdomen nondistended, soft, and nontender. Normal active bowel sounds. No enlargement of the liver or spleen. No masses palpable. Rectal: Deferred   Musculoskeletal: No pitting edema of the lower legs. Extremities have good range of motion. No costovertebral tenderness. Neurological: Gross memory appears intact. Patient is alert and oriented. Power 5/5  Psychiatric: Mood appears appropriate with judgement intact. Lymphatic: No cervical or supraclavicular adenopathy. See Discharge Instructions for further details. _______________________________________________________________________  DISPOSITION:    Home with Family: x   Home with HH/PT/OT/RN:    SNF/LTC:    MICHAEL:    OTHER:    ________________________________________________________________________  Medications Reviewed:  Current Discharge Medication List      START taking these medications    Details   acetaminophen (TYLENOL) 325 mg tablet Take 2 Tabs by mouth every six (6) hours as needed for Pain or Fever. Qty: 30 Tab, Refills: 0  Start date: 2021      cephALEXin (Keflex) 500 mg capsule Take 1 Cap by mouth three (3) times daily for 7 days.   Qty: 21 Cap, Refills: 0  Start date: 2021, End date: 2021      azithromycin (Zithromax) 500 mg tab Take 1 Tab by mouth daily for 3 days. Qty: 3 Tab, Refills: 0  Start date: 5/14/2021, End date: 5/17/2021      dexAMETHasone (DECADRON) 4 mg tablet Take 4 mg by mouth two (2) times daily (with meals) for 5 days. Qty: 10 Tab, Refills: 0  Start date: 5/14/2021, End date: 5/19/2021         CONTINUE these medications which have NOT CHANGED    Details   cloNIDine HCL (CATAPRES) 0.1 mg tablet Take 0.1 mg by mouth two (2) times a day. dilTIAZem ER (TIAZAC) 240 mg capsule Take 240 mg by mouth daily. gabapentin (NEURONTIN) 300 mg capsule Take 300 mg by mouth three (3) times daily. allopurinoL (ZYLOPRIM) 300 mg tablet Take 300 mg by mouth daily. warfarin (COUMADIN) 1 mg tablet Take 5 mg by mouth daily. Take 1 1/2 mon to Friday and 1 tab on sat and sunday      traZODone (DESYREL) 100 mg tablet Take 100 mg by mouth nightly. montelukast (SINGULAIR) 10 mg tablet Take 10 mg by mouth daily. theophylline ER,12 hour, (THEOCHRON) 300 mg tablet Take 300 mg by mouth two (2) times a day. pravastatin (PRAVACHOL) 40 mg tablet Take 40 mg by mouth nightly. abemaciclib (Verzenio) 100 mg tab Take 100 mg by mouth nightly. sertraline (ZOLOFT) 50 mg tablet Take 50 mg by mouth daily. oxyCODONE IR (ROXICODONE) 10 mg tab immediate release tablet Take 10 mg by mouth every six (6) hours as needed for Pain. PMH/SH reviewed - no change compared to H&P  ________________________________________________________________________    Recommended diet:  DIET REGULAR    Recommended activity:Activity as tolerated       Follow Up:   Follow-up Information     Follow up With Specialties Details Why Contact Info    Dr Geraldina Kayser PCP  In 1 week             ______________________________________________________________________  Total time spent in discharge (min): >35 min   ________________________________________________________________________    Care Plan discussed with:    Comments   Patient x    Family      RN x    Care Manager x                   Consultant:      ________________________________________________________________________  Attending Physician: Wei Rhoades MD  ________________________________________________________________________  **Lab Data Reviewed:  Recent Results (from the past 24 hour(s))   GLUCOSE, POC    Collection Time: 05/13/21 11:44 AM   Result Value Ref Range    Glucose (POC) 277 (H) 65 - 117 mg/dL    Performed by [employer]    GLUCOSE, POC    Collection Time: 05/13/21  5:12 PM   Result Value Ref Range    Glucose (POC) 248 (H) 65 - 117 mg/dL    Performed by [employer]    GLUCOSE, POC    Collection Time: 05/13/21  8:41 PM   Result Value Ref Range    Glucose (POC) 239 (H) 65 - 117 mg/dL    Performed by Glenna Cueva    GLUCOSE, POC    Collection Time: 05/14/21  7:27 AM   Result Value Ref Range    Glucose (POC) 260 (H) 65 - 117 mg/dL    Performed by [employer]      CT HEAD WO CONT   Final Result   No intracranial hemorrhage. XR CHEST PORT   Final Result   No plain film evidence for CHF or pneumonia. Suspect bone metastases right humerus. Recent Days:  Recent Labs     05/12/21  0519 05/11/21  1445   WBC 3.3* 3.0*   HGB 10.8* 8.8*   HCT 32.8* 27.3*    138*     Recent Labs     05/13/21  0604 05/12/21  0519 05/11/21  1123   NA  --  135* 133*   K  --  3.7 3.8   CL  --  105 104   CO2  --  19* 23   GLU  --  195* 140*   BUN  --  16 19   CREA  --  1.06* 1.45*   CA  --  9.2 8.9   MG  --   --  1.9   ALB  --  3.3* 3.0*   TBILI  --  0.6 0.6   ALT  --  25 22   INR 2.2* 1.9*  --      No results for input(s): PH, PCO2, PO2, HCO3, FIO2 in the last 72 hours.     All Micro Results     Procedure Component Value Units Date/Time    CULTURE, BLOOD, PAIRED [[id number]] Collected: 05/11/21 1123    Order Status: Completed Specimen: Blood Updated: 05/13/21 0750     Special Requests: No Special Requests        Culture result: No growth 2 days       CULTURE, URINE [[id number]] Collected: 05/11/21 1154    Order Status: Completed Specimen: Urine Updated: 05/12/21 2172     Special Requests: --        No Special Requests  Reflexed from [id number]       [place] Count --        >100,000  colonies/ml       Culture result:       POSSIBLE Enterococcus species          COVID-19 RAPID TEST [[id number]]  (Abnormal) Collected: 05/11/21 1725    Order Status: Completed Specimen: Nasopharyngeal Updated: 05/11/21 1946     Specimen source Nasopharyngeal        COVID-19 rapid test DETECTED        Comment: Rapid [employer] ID Now   The specimen is POSITIVE for SARS-CoV-2, the novel coronavirus associated with COVID-19. This test has been authorized by the FDA under an Emergency Use Authorization (EUA) for use by authorized laboratories.    Fact sheet for Healthcare Providers: kstattoo.com Fact sheet for Patients: kstattoo.com   Methodology: Isothermal Nucleic Acid Amplification Results verified, phoned to and read back by [email or web address]

## 2021-05-14 NOTE — PROGRESS NOTES
I charted missed medication for abemacicib tab 100mg. I thought medication was at home.  Medication was at pt bedside and patient did receive her morning dose at 0825 on may 14

## 2021-05-17 LAB
BACTERIA SPEC CULT: NORMAL
SPECIAL REQUESTS,SREQ: NORMAL

## 2021-05-18 ENCOUNTER — HOSPITAL ENCOUNTER (EMERGENCY)
Age: 66
Discharge: HOME | End: 2021-05-19
Attending: EMERGENCY MEDICINE
Payer: MEDICARE

## 2021-05-18 ENCOUNTER — APPOINTMENT (OUTPATIENT)
Dept: GENERAL RADIOLOGY | Age: 66
End: 2021-05-18
Attending: EMERGENCY MEDICINE
Payer: MEDICARE

## 2021-05-18 DIAGNOSIS — E16.2 HYPOGLYCEMIA: Primary | ICD-10-CM

## 2021-05-18 DIAGNOSIS — E87.6 HYPOKALEMIA: ICD-10-CM

## 2021-05-18 LAB
ALBUMIN SERPL-MCNC: 2.7 G/DL (ref 3.5–5)
ALBUMIN/GLOB SERPL: 0.7 {RATIO} (ref 1.1–2.2)
ALP SERPL-CCNC: 111 U/L (ref 45–117)
ALT SERPL-CCNC: 24 U/L (ref 12–78)
ANION GAP SERPL CALC-SCNC: 6 MMOL/L (ref 5–15)
AST SERPL W P-5'-P-CCNC: 25 U/L (ref 15–37)
BASOPHILS # BLD: 0 K/UL (ref 0–0.1)
BASOPHILS NFR BLD: 0 % (ref 0–1)
BILIRUB SERPL-MCNC: 0.4 MG/DL (ref 0.2–1)
BUN SERPL-MCNC: 18 MG/DL (ref 6–20)
BUN/CREAT SERPL: 16 (ref 12–20)
CA-I BLD-MCNC: 8.2 MG/DL (ref 8.5–10.1)
CHLORIDE SERPL-SCNC: 103 MMOL/L (ref 97–108)
CO2 SERPL-SCNC: 25 MMOL/L (ref 21–32)
CREAT SERPL-MCNC: 1.12 MG/DL (ref 0.55–1.02)
DIFFERENTIAL METHOD BLD: ABNORMAL
EOSINOPHIL NFR BLD: 2 % (ref 0–7)
ERYTHROCYTE [DISTWIDTH] IN BLOOD BY AUTOMATED COUNT: 14.6 % (ref 11.5–14.5)
GLOBULIN SER CALC-MCNC: 4 G/DL (ref 2–4)
GLUCOSE BLD STRIP.AUTO-MCNC: 89 MG/DL (ref 65–117)
GLUCOSE SERPL-MCNC: 83 MG/DL (ref 65–100)
HCT VFR BLD AUTO: 34.5 % (ref 35–47)
HGB BLD-MCNC: 11.7 G/DL (ref 11.5–16)
IMM GRANULOCYTES # BLD AUTO: 0 K/UL (ref 0–0.04)
IMM GRANULOCYTES NFR BLD AUTO: 1 % (ref 0–0.5)
LACTATE SERPL-SCNC: 1 MMOL/L (ref 0.4–2)
LYMPHOCYTES # BLD: 0.5 K/UL (ref 0.8–3.5)
LYMPHOCYTES NFR BLD: 8 % (ref 12–49)
MCH RBC QN AUTO: 32.9 PG (ref 26–34)
MCHC RBC AUTO-ENTMCNC: 33.9 G/DL (ref 30–36.5)
MCV RBC AUTO: 96.9 FL (ref 80–99)
MONOCYTES # BLD: 0.2 K/UL (ref 0–1)
MONOCYTES NFR BLD: 3 % (ref 5–13)
NEUTS SEG NFR BLD: 87 % (ref 32–75)
NRBC # BLD: 0.02 K/UL (ref 0–0.01)
NRBC BLD-RTO: 0.3 PER 100 WBC
PERFORMED BY, TECHID: NORMAL
PLATELET # BLD AUTO: 135 K/UL (ref 150–400)
PMV BLD AUTO: 9.2 FL (ref 8.9–12.9)
POTASSIUM SERPL-SCNC: 2.8 MMOL/L (ref 3.5–5.1)
PROT SERPL-MCNC: 6.7 G/DL (ref 6.4–8.2)
RBC # BLD AUTO: 3.56 M/UL (ref 3.8–5.2)
SODIUM SERPL-SCNC: 134 MMOL/L (ref 136–145)
WBC # BLD AUTO: 6 K/UL (ref 3.6–11)

## 2021-05-18 PROCEDURE — 74011000250 HC RX REV CODE- 250: Performed by: EMERGENCY MEDICINE

## 2021-05-18 PROCEDURE — 80053 COMPREHEN METABOLIC PANEL: CPT

## 2021-05-18 PROCEDURE — 87040 BLOOD CULTURE FOR BACTERIA: CPT

## 2021-05-18 PROCEDURE — 71045 X-RAY EXAM CHEST 1 VIEW: CPT

## 2021-05-18 PROCEDURE — 82962 GLUCOSE BLOOD TEST: CPT

## 2021-05-18 PROCEDURE — 96368 THER/DIAG CONCURRENT INF: CPT

## 2021-05-18 PROCEDURE — 83605 ASSAY OF LACTIC ACID: CPT

## 2021-05-18 PROCEDURE — 94640 AIRWAY INHALATION TREATMENT: CPT

## 2021-05-18 PROCEDURE — 99284 EMERGENCY DEPT VISIT MOD MDM: CPT

## 2021-05-18 PROCEDURE — 96365 THER/PROPH/DIAG IV INF INIT: CPT

## 2021-05-18 PROCEDURE — 96375 TX/PRO/DX INJ NEW DRUG ADDON: CPT

## 2021-05-18 PROCEDURE — 36415 COLL VENOUS BLD VENIPUNCTURE: CPT

## 2021-05-18 PROCEDURE — 85025 COMPLETE CBC W/AUTO DIFF WBC: CPT

## 2021-05-18 RX ORDER — POTASSIUM CHLORIDE 20 MEQ/1
20 TABLET, EXTENDED RELEASE ORAL 2 TIMES DAILY
Status: DISCONTINUED | OUTPATIENT
Start: 2021-05-18 | End: 2021-05-19 | Stop reason: HOSPADM

## 2021-05-18 RX ORDER — MAGNESIUM SULFATE 1 G/100ML
1 INJECTION INTRAVENOUS
Status: COMPLETED | OUTPATIENT
Start: 2021-05-18 | End: 2021-05-19

## 2021-05-18 RX ORDER — POTASSIUM CHLORIDE 7.45 MG/ML
10 INJECTION INTRAVENOUS ONCE
Status: COMPLETED | OUTPATIENT
Start: 2021-05-18 | End: 2021-05-19

## 2021-05-18 RX ORDER — IPRATROPIUM BROMIDE AND ALBUTEROL SULFATE 2.5; .5 MG/3ML; MG/3ML
3 SOLUTION RESPIRATORY (INHALATION)
Status: COMPLETED | OUTPATIENT
Start: 2021-05-18 | End: 2021-05-18

## 2021-05-18 RX ADMIN — IPRATROPIUM BROMIDE AND ALBUTEROL SULFATE 3 ML: .5; 3 SOLUTION RESPIRATORY (INHALATION) at 22:08

## 2021-05-19 VITALS
OXYGEN SATURATION: 93 % | SYSTOLIC BLOOD PRESSURE: 157 MMHG | DIASTOLIC BLOOD PRESSURE: 71 MMHG | RESPIRATION RATE: 27 BRPM | HEART RATE: 96 BPM

## 2021-05-19 PROCEDURE — 74011250636 HC RX REV CODE- 250/636: Performed by: EMERGENCY MEDICINE

## 2021-05-19 PROCEDURE — 74011250637 HC RX REV CODE- 250/637: Performed by: EMERGENCY MEDICINE

## 2021-05-19 RX ADMIN — MAGNESIUM SULFATE HEPTAHYDRATE 1 G: 1 INJECTION, SOLUTION INTRAVENOUS at 00:04

## 2021-05-19 RX ADMIN — POTASSIUM CHLORIDE 20 MEQ: 1500 TABLET, EXTENDED RELEASE ORAL at 00:04

## 2021-05-19 RX ADMIN — METHYLPREDNISOLONE SODIUM SUCCINATE 125 MG: 125 INJECTION, POWDER, FOR SOLUTION INTRAMUSCULAR; INTRAVENOUS at 00:04

## 2021-05-19 RX ADMIN — POTASSIUM CHLORIDE 10 MEQ: 7.46 INJECTION, SOLUTION INTRAVENOUS at 00:04

## 2021-05-19 NOTE — ED PROVIDER NOTES
EMERGENCY DEPARTMENT HISTORY AND PHYSICAL EXAM      Date: 5/18/2021  Patient Name: George Fontana      History of Presenting Illness     Chief Complaint   Patient presents with    Low Blood Sugar       History Provided By: Patient and EMS    HPI: George Fontana, 77 y.o. female with a past medical history significant diabetes, hypertension, hyperlipidemia, obesity, asthma and Breast cancer with metastasis to the bone presents to the ED with cc of 1 episode of hypoglycemia. Patient also complains of cough, shortness of breath and wheezing. She has history of longstanding asthma. Patient denies any chest pain. No headache. No weakness. No diarrhea. No vomiting. No fever. No other complaints at this time. There are no other complaints, changes, or physical findings at this time. PCP: Unknown, Provider    Current Facility-Administered Medications   Medication Dose Route Frequency Provider Last Rate Last Admin    methylPREDNISolone (PF) (Solu-MEDROL) injection 125 mg  125 mg IntraVENous NOW Delon Brock H, DO        magnesium sulfate 1 g/100 ml IVPB (premix or compounded)  1 g IntraVENous NOW Delon Brock, DO        potassium chloride 10 mEq in 100 ml IVPB  10 mEq IntraVENous ONCE Delon Brock, DO        potassium chloride (K-DUR, KLOR-CON) SR tablet 20 mEq  20 mEq Oral BID Tyshawn Michelle, DO         Current Outpatient Medications   Medication Sig Dispense Refill    acetaminophen (TYLENOL) 325 mg tablet Take 2 Tabs by mouth every six (6) hours as needed for Pain or Fever. 30 Tab 0    cephALEXin (Keflex) 500 mg capsule Take 1 Cap by mouth three (3) times daily for 7 days. 21 Cap 0    dexAMETHasone (DECADRON) 4 mg tablet Take 4 mg by mouth two (2) times daily (with meals) for 5 days. 10 Tab 0    cloNIDine HCL (CATAPRES) 0.1 mg tablet Take 0.1 mg by mouth two (2) times a day.  dilTIAZem ER (TIAZAC) 240 mg capsule Take 240 mg by mouth daily.       gabapentin (NEURONTIN) 300 mg capsule Take 300 mg by mouth three (3) times daily.  allopurinoL (ZYLOPRIM) 300 mg tablet Take 300 mg by mouth daily.  warfarin (COUMADIN) 1 mg tablet Take 5 mg by mouth daily. Take 1 1/2 mon to Friday and 1 tab on sat and sunday      traZODone (DESYREL) 100 mg tablet Take 100 mg by mouth nightly.  montelukast (SINGULAIR) 10 mg tablet Take 10 mg by mouth daily.  theophylline ER,12 hour, (THEOCHRON) 300 mg tablet Take 300 mg by mouth two (2) times a day.  pravastatin (PRAVACHOL) 40 mg tablet Take 40 mg by mouth nightly.  abemaciclib (Verzenio) 100 mg tab Take 100 mg by mouth nightly.  sertraline (ZOLOFT) 50 mg tablet Take 50 mg by mouth daily.  oxyCODONE IR (ROXICODONE) 10 mg tab immediate release tablet Take 10 mg by mouth every six (6) hours as needed for Pain. Past History     Past Medical History:  Past Medical History:   Diagnosis Date    Asthma     [facility] [address]     Chronic obstructive pulmonary disease ([facility] [address]     Diabetes ([facility] [address]     Hypertension        Past Surgical History:  History reviewed. No pertinent surgical history. Family History:  History reviewed. No pertinent family history. Social History:  Social History     Tobacco Use    Smoking status: Never Smoker    Smokeless tobacco: Never Used   Substance Use Topics    Alcohol use: Never     Frequency: Never    Drug use: Never       Allergies:  No Known Allergies      Review of Systems     Review of Systems   Constitutional: Negative. HENT: Negative. Eyes: Negative. Respiratory: Positive for shortness of breath and wheezing. Cardiovascular: Negative. Gastrointestinal: Negative. Endocrine:        Low blood glucose level   Genitourinary: Negative. Musculoskeletal: Negative. Skin: Negative. Neurological: Negative. Psychiatric/Behavioral: Negative. All other systems reviewed and are negative.       Physical Exam     Physical Exam  Nursing note reviewed. Constitutional:       General: She is not in acute distress. Appearance: Normal appearance. She is normal weight. She is not ill-appearing or diaphoretic. HENT:      Head: Normocephalic. Right Ear: External ear normal.      Left Ear: External ear normal.      Nose: Nose normal. No congestion or rhinorrhea. Mouth/Throat:      Pharynx: Oropharynx is clear. No oropharyngeal exudate or posterior oropharyngeal erythema. Eyes:      General: No scleral icterus. Right eye: No discharge. Left eye: No discharge. Extraocular Movements: Extraocular movements intact. Conjunctiva/sclera: Conjunctivae normal.      Pupils: Pupils are equal, round, and reactive to light. Neck:      Musculoskeletal: Normal range of motion and neck supple. No neck rigidity or muscular tenderness. Cardiovascular:      Rate and Rhythm: Normal rate and regular rhythm. Pulses: Normal pulses. Heart sounds: Normal heart sounds. No murmur. Pulmonary:      Effort: Respiratory distress present. Breath sounds: No stridor. Wheezing present. No rhonchi or rales. Chest:      Chest wall: No tenderness. Abdominal:      General: Abdomen is flat. Bowel sounds are normal. There is no distension. Palpations: Abdomen is soft. Tenderness: There is no abdominal tenderness. There is no right CVA tenderness, left CVA tenderness, guarding or rebound. Musculoskeletal: Normal range of motion. General: No swelling, tenderness, deformity or signs of injury. Right lower leg: No edema. Left lower leg: No edema. Skin:     General: Skin is warm. Capillary Refill: Capillary refill takes less than 2 seconds. Coloration: Skin is not jaundiced or pale. Findings: No bruising, erythema, lesion or rash. Neurological:      General: No focal deficit present. Mental Status: She is alert and oriented to person, place, and time. Mental status is at baseline. Cranial Nerves: No cranial nerve deficit. Sensory: No sensory deficit. Motor: Weakness present. Coordination: Coordination normal.      Comments: Generalized weakness   Psychiatric:         Mood and Affect: Mood normal.         Behavior: Behavior normal.         Thought Content: Thought content normal.         Judgment: Judgment normal.         Lab and Diagnostic Study Results     Labs -     Recent Results (from the past 12 hour(s))   GLUCOSE, POC    Collection Time: 05/18/21  9:23 PM   Result Value Ref Range    Glucose (POC) 89 65 - 117 mg/dL    Performed by Alex Spears ( Tech)    CBC WITH AUTOMATED DIFF    Collection Time: 05/18/21 10:35 PM   Result Value Ref Range    WBC 6.0 3.6 - 11.0 K/uL    RBC 3.56 (L) 3.80 - 5.20 M/uL    HGB 11.7 11.5 - 16.0 g/dL    HCT 34.5 (L) 35.0 - 47.0 %    MCV 96.9 80.0 - 99.0 FL    MCH 32.9 26.0 - 34.0 PG    MCHC 33.9 30.0 - 36.5 g/dL    RDW 14.6 (H) 11.5 - 14.5 %    PLATELET 926 (L) 790 - 400 K/uL    MPV 9.2 8.9 - 12.9 FL    NRBC 0.3 (H) 0.0  WBC    ABSOLUTE NRBC 0.02 (H) 0.00 - 0.01 K/uL    NEUTROPHILS 87 (H) 32 - 75 %    LYMPHOCYTES 8 (L) 12 - 49 %    MONOCYTES 3 (L) 5 - 13 %    EOSINOPHILS 2 0 - 7 %    BASOPHILS 0 0 - 1 %    IMMATURE GRANULOCYTES 1 (H) 0 - 0.5 %    ABS. LYMPHOCYTES 0.5 (L) 0.8 - 3.5 K/UL    ABS. MONOCYTES 0.2 0.0 - 1.0 K/UL    ABS. BASOPHILS 0.0 0.0 - 0.1 K/UL    ABS. IMM.  GRANS. 0 0.00 - 0.04 K/UL    DF AUTOMATED     METABOLIC PANEL, COMPREHENSIVE    Collection Time: 05/18/21 10:35 PM   Result Value Ref Range    Sodium 134 (L) 136 - 145 mmol/L    Potassium 2.8 (L) 3.5 - 5.1 mmol/L    Chloride 103 97 - 108 mmol/L    CO2 25 21 - 32 mmol/L    Anion gap 6 5 - 15 mmol/L    Glucose 83 65 - 100 mg/dL    BUN 18 6 - 20 mg/dL    Creatinine 1.12 (H) 0.55 - 1.02 mg/dL    BUN/Creatinine ratio 16 12 - 20      GFR est AA 59 (L) >60 ml/min/1.73m2    GFR est non-AA 49 (L) >60 ml/min/1.73m2    Calcium 8.2 (L) 8.5 - 10.1 mg/dL    Bilirubin, total 0.4 0.2 - 1.0 mg/dL    AST (SGOT) 25 15 - 37 U/L    ALT (SGPT) 24 12 - 78 U/L    Alk. phosphatase 111 45 - 117 U/L    Protein, total 6.7 6.4 - 8.2 g/dL    Albumin 2.7 (L) 3.5 - 5.0 g/dL    Globulin 4.0 2.0 - 4.0 g/dL    A-G Ratio 0.7 (L) 1.1 - 2.2     LACTIC ACID    Collection Time: 05/18/21 10:35 PM   Result Value Ref Range    Lactic acid 1.0 0.4 - 2.0 mmol/L       Radiologic Studies -   [unfilled]  CT Results  (Last 48 hours)    None        CXR Results  (Last 48 hours)               05/18/21 2130  XR CHEST PORT Final result    Impression:  1. Left perihilar and basilar airspace opacity suspected, atelectasis,   pneumonia or edema. 2.  Mild pulmonary vascular indistinctness with visual thickening on the right   may be from mild interstitial edema. Possible right pleural effusion. 3.  Right humeral sclerotic lesions again noted. Narrative:  Portable radiograph of the chest, one view       INDICATION: Low blood sugar       COMPARISON: 5/11/2021       FINDINGS:   Right IJ approach port catheter is present, with tip projecting over the SVC. Study is limited by patient rotation. Nonspecific left perihilar opacities are   noted. There is obscuration of the left hemidiaphragmatic silhouette, which may   be partially technical. Mild pulmonary vascular indistinctness and fissural   thickening is noted on the right. Possible mild right pleural effusion. Atherosclerotic calcification of the aortic arch. There is no pneumothorax. Sclerotic right humeral lesion again noted. Medical Decision Making and ED Course   - I am the first and primary provider for this patient AND AM THE PRIMARY PROVIDER OF RECORD. - I reviewed the vital signs, available nursing notes, past medical history, past surgical history, family history and social history. - Initial assessment performed.  The patients presenting problems have been discussed, and the staff are in agreement with the care plan formulated and outlined with them. I have encouraged them to ask questions as they arise throughout their visit. Vital Signs-Reviewed the patient's vital signs. Patient Vitals for the past 12 hrs:   SpO2   05/18/21 2208 94 %             Records Reviewed: Nursing Notes and Ambulance Run Sheet        ED Course:              Provider Notes (Medical Decision Making):     MDM  Number of Diagnoses or Management Options  Hypoglycemia: new, needed workup  Hypokalemia: new, needed workup  Diagnosis management comments: Differential diagnosis include hypoglycemia, infection, UTI, pneumonia, bronchitis, asthma, exacerbation of asthma, sepsis, recent change in medicine. Amount and/or Complexity of Data Reviewed  Clinical lab tests: ordered and reviewed  Tests in the radiology section of CPT®: ordered and reviewed  Tests in the medicine section of CPT®: reviewed and ordered    Risk of Complications, Morbidity, and/or Mortality  Presenting problems: high  Diagnostic procedures: high  Management options: high  General comments: Patient remained stable throughout the course of treatment. Patient latest blood glucose level is 83. We will keep patient food  to eat. Patient potassium also was on the low side at 2.8. Potassium supplement was given. Case discussed with patient. Patient understood and agree with her management. Consultations:       Consultations:         Procedures and Critical Care       Performed by: Alcira Ga DO  PROCEDURES:  Procedures               CRITICAL CARE NOTE :  9:17 PM  Amount of Critical Care Time: (minutes)    IMPENDING DETERIORATION -  ASSOCIATED RISK FACTORS -   MANAGEMENT-   INTERPRETATION -    INTERVENTIONS -   CASE REVIEW -   TREATMENT RESPONSE -  PERFORMED BY -   NOTES   :  I have spent critical care time involved in lab review, consultations with specialist, family decision- making, bedside attention and documentation.  This time excludes time spent in any separate billed procedures. During this entire length of time I was immediately available to the patient . Olena Pickett, DO        Disposition     Disposition: Condition stable and improved    Discharged    Remove if not discharged  DISCHARGE PLAN:  1. Current Discharge Medication List      CONTINUE these medications which have NOT CHANGED    Details   acetaminophen (TYLENOL) 325 mg tablet Take 2 Tabs by mouth every six (6) hours as needed for Pain or Fever. Qty: 30 Tab, Refills: 0      cephALEXin (Keflex) 500 mg capsule Take 1 Cap by mouth three (3) times daily for 7 days. Qty: 21 Cap, Refills: 0      dexAMETHasone (DECADRON) 4 mg tablet Take 4 mg by mouth two (2) times daily (with meals) for 5 days. Qty: 10 Tab, Refills: 0      cloNIDine HCL (CATAPRES) 0.1 mg tablet Take 0.1 mg by mouth two (2) times a day. dilTIAZem ER (TIAZAC) 240 mg capsule Take 240 mg by mouth daily. gabapentin (NEURONTIN) 300 mg capsule Take 300 mg by mouth three (3) times daily. allopurinoL (ZYLOPRIM) 300 mg tablet Take 300 mg by mouth daily. warfarin (COUMADIN) 1 mg tablet Take 5 mg by mouth daily. Take 1 1/2 mon to Friday and 1 tab on sat and       traZODone (DESYREL) 100 mg tablet Take 100 mg by mouth nightly. montelukast (SINGULAIR) 10 mg tablet Take 10 mg by mouth daily. theophylline ER,12 hour, (THEOCHRON) 300 mg tablet Take 300 mg by mouth two (2) times a day. pravastatin (PRAVACHOL) 40 mg tablet Take 40 mg by mouth nightly. abemaciclib (Verzenio) 100 mg tab Take 100 mg by mouth nightly. sertraline (ZOLOFT) 50 mg tablet Take 50 mg by mouth daily. oxyCODONE IR (ROXICODONE) 10 mg tab immediate release tablet Take 10 mg by mouth every six (6) hours as needed for Pain.          STOP taking these medications       azithromycin (Zithromax) 500 mg tab Comments:   Reason for Stoppin.   Follow-up Information     Follow up With Specialties Details Why Contact Info Unknown, Provider  Schedule an appointment as soon as possible for a visit in 1 day  Patient not available to ask      [facility]  Schedule an appointment as soon as possible for a visit in 1 day  [address] [place] [address]  [phone]        3. Return to ED if worse   4. Current Discharge Medication List          Diagnosis     Clinical Impression:   1. Hypoglycemia    2. Hypokalemia        Attestations:    Olena Pickett, DO    Please note that this dictation was completed with [employer], the computer voice recognition software. Quite often unanticipated grammatical, syntax, homophones, and other interpretive errors are inadvertently transcribed by the computer software. Please disregard these errors. Please excuse any errors that have escaped final proofreading. Thank you.

## 2021-05-25 LAB
BACTERIA SPEC CULT: NORMAL
SPECIAL REQUESTS,SREQ: NORMAL

## 2022-03-18 PROBLEM — U07.1 COVID-19: Status: ACTIVE | Noted: 2021-05-12

## 2022-03-18 PROBLEM — I10 HTN (HYPERTENSION): Status: ACTIVE | Noted: 2021-05-11

## 2022-03-18 PROBLEM — E78.5 HLD (HYPERLIPIDEMIA): Status: ACTIVE | Noted: 2021-05-11

## 2022-03-19 PROBLEM — A41.9 SEPSIS (HCC): Status: ACTIVE | Noted: 2021-05-11

## 2022-03-19 PROBLEM — R50.9 FEVER: Status: ACTIVE | Noted: 2021-05-11

## 2022-03-19 PROBLEM — J45.901 REACTIVE AIRWAY DISEASE WITH ACUTE EXACERBATION: Status: ACTIVE | Noted: 2021-05-11

## 2022-03-19 PROBLEM — C50.919 BREAST CANCER (HCC): Status: ACTIVE | Noted: 2021-05-11

## 2022-03-19 PROBLEM — F32.A DEPRESSION: Status: ACTIVE | Noted: 2021-05-11

## 2023-05-21 RX ORDER — TRAZODONE HYDROCHLORIDE 100 MG/1
100 TABLET ORAL NIGHTLY
COMMUNITY

## 2023-05-21 RX ORDER — DILTIAZEM HYDROCHLORIDE 240 MG/1
240 CAPSULE, EXTENDED RELEASE ORAL DAILY
COMMUNITY

## 2023-05-21 RX ORDER — OXYCODONE HYDROCHLORIDE 10 MG/1
10 TABLET ORAL EVERY 6 HOURS PRN
COMMUNITY

## 2023-05-21 RX ORDER — ABEMACICLIB 100 MG/1
100 TABLET ORAL NIGHTLY
COMMUNITY

## 2023-05-21 RX ORDER — PRAVASTATIN SODIUM 40 MG
40 TABLET ORAL NIGHTLY
COMMUNITY

## 2023-05-21 RX ORDER — ACETAMINOPHEN 325 MG/1
650 TABLET ORAL EVERY 6 HOURS PRN
COMMUNITY
Start: 2021-05-14

## 2023-05-21 RX ORDER — WARFARIN SODIUM 1 MG/1
5 TABLET ORAL DAILY
COMMUNITY

## 2023-05-21 RX ORDER — THEOPHYLLINE 300 MG/1
300 TABLET, EXTENDED RELEASE ORAL 2 TIMES DAILY
COMMUNITY

## 2023-05-21 RX ORDER — GABAPENTIN 300 MG/1
300 CAPSULE ORAL 3 TIMES DAILY
COMMUNITY

## 2023-05-21 RX ORDER — CLONIDINE HYDROCHLORIDE 0.1 MG/1
0.1 TABLET ORAL 2 TIMES DAILY
COMMUNITY

## 2023-05-21 RX ORDER — ALLOPURINOL 300 MG/1
300 TABLET ORAL DAILY
COMMUNITY

## 2023-05-21 RX ORDER — MONTELUKAST SODIUM 10 MG/1
10 TABLET ORAL DAILY
COMMUNITY